# Patient Record
Sex: FEMALE | HISPANIC OR LATINO | Employment: FULL TIME | URBAN - METROPOLITAN AREA
[De-identification: names, ages, dates, MRNs, and addresses within clinical notes are randomized per-mention and may not be internally consistent; named-entity substitution may affect disease eponyms.]

---

## 2019-02-20 ENCOUNTER — HOSPITAL ENCOUNTER (EMERGENCY)
Facility: CLINIC | Age: 19
Discharge: HOME | End: 2019-02-20
Attending: EMERGENCY MEDICINE | Admitting: EMERGENCY MEDICINE

## 2019-02-20 VITALS
HEIGHT: 68 IN | RESPIRATION RATE: 18 BRPM | SYSTOLIC BLOOD PRESSURE: 113 MMHG | TEMPERATURE: 98.4 F | DIASTOLIC BLOOD PRESSURE: 70 MMHG | OXYGEN SATURATION: 99 % | BODY MASS INDEX: 21.98 KG/M2 | WEIGHT: 145 LBS | HEART RATE: 88 BPM

## 2019-02-20 DIAGNOSIS — R55 SYNCOPE, UNSPECIFIED SYNCOPE TYPE: ICD-10-CM

## 2019-02-20 LAB
ALBUMIN UR-MCNC: NEGATIVE MG/DL
ANION GAP SERPL CALCULATED.3IONS-SCNC: 10 MMOL/L (ref 3–14)
APPEARANCE UR: ABNORMAL
BACTERIA #/AREA URNS HPF: ABNORMAL /HPF
BASOPHILS # BLD AUTO: 0 10E9/L (ref 0–0.2)
BASOPHILS NFR BLD AUTO: 0.5 %
BILIRUB UR QL STRIP: NEGATIVE
BUN SERPL-MCNC: 15 MG/DL (ref 7–30)
CALCIUM SERPL-MCNC: 9.3 MG/DL (ref 8.5–10.1)
CHLORIDE SERPL-SCNC: 103 MMOL/L (ref 96–110)
CO2 SERPL-SCNC: 25 MMOL/L (ref 20–32)
COLOR UR AUTO: YELLOW
CREAT SERPL-MCNC: 0.62 MG/DL (ref 0.5–1)
DIFFERENTIAL METHOD BLD: NORMAL
EOSINOPHIL # BLD AUTO: 0 10E9/L (ref 0–0.7)
EOSINOPHIL NFR BLD AUTO: 0.3 %
ERYTHROCYTE [DISTWIDTH] IN BLOOD BY AUTOMATED COUNT: 12.3 % (ref 10–15)
GFR SERPL CREATININE-BSD FRML MDRD: >90 ML/MIN/{1.73_M2}
GLUCOSE SERPL-MCNC: 83 MG/DL (ref 70–99)
GLUCOSE UR STRIP-MCNC: NEGATIVE MG/DL
HCT VFR BLD AUTO: 43.3 % (ref 35–47)
HGB BLD-MCNC: 14.6 G/DL (ref 11.7–15.7)
HGB UR QL STRIP: NEGATIVE
IMM GRANULOCYTES # BLD: 0 10E9/L (ref 0–0.4)
IMM GRANULOCYTES NFR BLD: 0.3 %
KETONES UR STRIP-MCNC: 20 MG/DL
LEUKOCYTE ESTERASE UR QL STRIP: ABNORMAL
LYMPHOCYTES # BLD AUTO: 1.9 10E9/L (ref 0.8–5.3)
LYMPHOCYTES NFR BLD AUTO: 23.7 %
MCH RBC QN AUTO: 28.1 PG (ref 26.5–33)
MCHC RBC AUTO-ENTMCNC: 33.7 G/DL (ref 31.5–36.5)
MCV RBC AUTO: 83 FL (ref 78–100)
MONOCYTES # BLD AUTO: 0.5 10E9/L (ref 0–1.3)
MONOCYTES NFR BLD AUTO: 6.1 %
MUCOUS THREADS #/AREA URNS LPF: PRESENT /LPF
NEUTROPHILS # BLD AUTO: 5.4 10E9/L (ref 1.6–8.3)
NEUTROPHILS NFR BLD AUTO: 69.1 %
NITRATE UR QL: NEGATIVE
NRBC # BLD AUTO: 0 10*3/UL
NRBC BLD AUTO-RTO: 0 /100
PH UR STRIP: 5 PH (ref 5–7)
PLATELET # BLD AUTO: 275 10E9/L (ref 150–450)
POTASSIUM SERPL-SCNC: 3.5 MMOL/L (ref 3.4–5.3)
RBC # BLD AUTO: 5.19 10E12/L (ref 3.8–5.2)
RBC #/AREA URNS AUTO: 4 /HPF (ref 0–2)
SODIUM SERPL-SCNC: 138 MMOL/L (ref 133–144)
SOURCE: ABNORMAL
SP GR UR STRIP: 1.03 (ref 1–1.03)
SQUAMOUS #/AREA URNS AUTO: <1 /HPF (ref 0–1)
UROBILINOGEN UR STRIP-MCNC: 0 MG/DL (ref 0–2)
WBC # BLD AUTO: 7.9 10E9/L (ref 4–11)
WBC #/AREA URNS AUTO: 7 /HPF (ref 0–5)

## 2019-02-20 PROCEDURE — 25000128 H RX IP 250 OP 636: Performed by: EMERGENCY MEDICINE

## 2019-02-20 PROCEDURE — 80048 BASIC METABOLIC PNL TOTAL CA: CPT | Performed by: EMERGENCY MEDICINE

## 2019-02-20 PROCEDURE — 87086 URINE CULTURE/COLONY COUNT: CPT | Performed by: EMERGENCY MEDICINE

## 2019-02-20 PROCEDURE — 93005 ELECTROCARDIOGRAM TRACING: CPT

## 2019-02-20 PROCEDURE — 81001 URINALYSIS AUTO W/SCOPE: CPT | Performed by: EMERGENCY MEDICINE

## 2019-02-20 PROCEDURE — 99284 EMERGENCY DEPT VISIT MOD MDM: CPT | Mod: 25

## 2019-02-20 PROCEDURE — 85025 COMPLETE CBC W/AUTO DIFF WBC: CPT | Performed by: EMERGENCY MEDICINE

## 2019-02-20 PROCEDURE — 96361 HYDRATE IV INFUSION ADD-ON: CPT

## 2019-02-20 PROCEDURE — 96360 HYDRATION IV INFUSION INIT: CPT

## 2019-02-20 RX ORDER — ESTRADIOL 1 MG/1
1 TABLET ORAL DAILY
COMMUNITY
End: 2023-08-01

## 2019-02-20 RX ORDER — DEXTROAMPHETAMINE SACCHARATE, AMPHETAMINE ASPARTATE, DEXTROAMPHETAMINE SULFATE AND AMPHETAMINE SULFATE 3.75; 3.75; 3.75; 3.75 MG/1; MG/1; MG/1; MG/1
15 TABLET ORAL 2 TIMES DAILY
COMMUNITY
End: 2023-12-27

## 2019-02-20 RX ORDER — SODIUM CHLORIDE 9 MG/ML
1000 INJECTION, SOLUTION INTRAVENOUS CONTINUOUS
Status: DISCONTINUED | OUTPATIENT
Start: 2019-02-20 | End: 2019-02-21 | Stop reason: HOSPADM

## 2019-02-20 RX ADMIN — SODIUM CHLORIDE 1000 ML: 9 INJECTION, SOLUTION INTRAVENOUS at 21:58

## 2019-02-20 RX ADMIN — SODIUM CHLORIDE 1000 ML: 9 INJECTION, SOLUTION INTRAVENOUS at 20:01

## 2019-02-20 ASSESSMENT — ENCOUNTER SYMPTOMS
LIGHT-HEADEDNESS: 1
DYSURIA: 1
WEAKNESS: 0
NAUSEA: 1
HEADACHES: 1
FEVER: 0

## 2019-02-20 ASSESSMENT — MIFFLIN-ST. JEOR: SCORE: 1481.22

## 2019-02-20 NOTE — LETTER
February 20, 2019      To Whom It May Concern:      Cindi Armas was seen in our Emergency Department today, 02/20/19.  I expect her condition to improve over the next 1-2 days.  She may return to work/school when improved.    Sincerely,        Jennie Sahni RN

## 2019-02-20 NOTE — ED AVS SNAPSHOT
[facility] Emergency Department  [address]  [facility] [address]  Phone:  [phone]  Fax:  [phone]                                    Cindi Armas   MRN: [id number]    Department:  [facility] Emergency Department   Date of Visit:  2/20/2019           After Visit Summary Signature Page    I have received my discharge instructions, and my questions have been answered. I have discussed any challenges I see with this plan with the nurse or doctor.    ..........................................................................................................................................  Patient/Patient Representative Signature      ..........................................................................................................................................  Patient Representative Print Name and Relationship to Patient    ..................................................               ................................................  Date                                   Time    ..........................................................................................................................................  Reviewed by Signature/Title    ...................................................              ..............................................  Date                                               Time          22EPIC Rev 08/18

## 2019-02-21 LAB — INTERPRETATION ECG - MUSE: NORMAL

## 2019-02-21 NOTE — ED PROVIDER NOTES
"  History     Chief Complaint:  loss of consciousness     HPI   Cindi Armas is a generally healthy 18 year old transgender female who presents after an episode of loss of consciousness. The patient states that she was working today when she felt nausea and approximately 2 hours later, felt dizzy and had an episode of loss of consciousness, prompting her ED visit. Here, the patient states that did not eat or drink anything today. She complains of headache, nausea, and fatigue. The patient notes that she has been having headaches for the past month with associated photophobia. Of note, the patient states that she has been having dysuria ongoing for a month approximately as well as increased urgency. She denies any fever.     Allergies:  NKDA     Medications:    Adderall  Estradiol    Past Medical History:    Transgender    Past Surgical History:    The patient does not have any pertinent past surgical history.     Family History:    No past pertinent family history.     Social History:  Negative for alcohol use.  Negative for tobacco use.     Review of Systems   Constitutional: Negative for fever.   Gastrointestinal: Positive for nausea.   Genitourinary: Positive for dysuria and urgency.   Neurological: Positive for light-headedness and headaches. Negative for weakness.   All other systems reviewed and are negative.      Physical Exam   First Vitals:  BP: 133/81  Pulse: 90  Heart Rate: 90  Temp: 98.4  F (36.9  C)  Resp: 18  Height: 172.7 cm (5' 8\")  Weight: 65.8 kg (145 lb)  SpO2: 95 %    Orthostatics 2016:  Laying:   BP: 122/76  Pulse: 78 bmp    Sitting:  BP: 125/80  Pulse: 80 bmp     Orthostatics 2144:  Laying:  BP: 116/72  Pulse: 75 bpm    Sitting:   BP: 112/63  Pulse: 89 bpm    Standing:  BP: 106/67  Pulse: 103 bpm      Physical Exam  General: Patient is alert and interactive when I enter the room  Head:  The scalp, face, and head appear normal  Eyes:  Conjunctivae are normal  ENT:    The nose is " normal    Pinnae are normal    External acoustic canals are normal  Neck:  Trachea midline  CV:  Pulses are normal, RRR  Resp:  No respiratory distress, CTAB   Abdomen:      Soft, non-tender, non-distended  Musc:  Normal muscular tone    No major joint effusions    No asymmetric leg swelling  Skin:  No rash or lesions noted  Neuro:  Speech is normal and fluent. Face is symmetric.     Moving all extremities well.   Psych: Awake. Alert.  Normal affect.  Appropriate interactions.    Emergency Department Course   ECG:  Indication: loss of consciousness   Time: 1922  Vent. Rate 81 bpm. MD interval 158. QRS duration 82. QT/QTc 374/434. P-R-T axis 61 48 23. Normal sinus rhythm. Normal ECG. Read time: 1924.      Laboratory:  CBC: WBC: 7.9, HGB: 14.6, PLT: 275  BMP: all WNL (Creatinine: 0.62)     UA with micro: ketones: 20, small leukocyte esterase, WBC: 7, RBC: 4, few bacteria, mucous present, o/w negative     Interventions:  2001 0.9% Sodium Chloride Bolus, 1L, IV    2158 0.9% Sodium Chloride Bolus, 1L, IV     Emergency Department Course:  Nursing notes and vitals reviewed.     1947  I performed an exam of the patient as documented above.     IV inserted. Medicine administered as documented above. Blood drawn. This was sent to the lab for further testing, results above. The patient provided a urine sample here in the emergency department. This was sent for laboratory testing, findings above.      2333 I rechecked the patient and discussed the results of her workup thus far.     Findings and plan explained to the Patient. Patient discharged home with instructions regarding supportive care, medications, and reasons to return. The importance of close follow-up was reviewed.     I personally reviewed the laboratory results with the Patient and answered all related questions prior to discharge.        Impression & Plan      Medical Decision Making:  Cindi Armas is a 18 year old female who presents with a history and  clinical exam consistent with syncope.  While dehydration was the most likely etiology given the history of this patient, I considered a broad differential for their syncope today including cardiac arrhythmia, ACS, aortic stenosis, HOCM, PE, orthostatic hypotension, drugs, situational, carotid hypersensitivity, seizure, TIA, stroke, vasovagal.   She has no signs of a concerning etiology for syncope at this point.  In addition, she has no chest pain, no seizure activity or post-ictal period and no murmur. No focal neurologic symptoms, and no complaints of concerning headache.  So was given 2 L of fluid and encouraged oral hydration.  Patient felt much improved and was able to ambulate with no symptoms.  The workup in the ED is negative and the physical exam is re-assuring.  Supportive outpatient management is therefore indicated.      Diagnosis:    ICD-10-CM   1. Syncope, unspecified syncope type R55       Disposition:  discharged to home    IKelly, am serving as a scribe on 2/20/2019 at 10:28 PM to personally document services performed by Ashli Perez MD based on my observations and the provider's statements to me.      Kelly Zapata  2/20/2019   [facility] EMERGENCY DEPARTMENT       Ashli Perez MD  02/23/19 0329

## 2019-02-21 NOTE — ED NOTES
Per MD Perez request patient was ambulated without assistance and tolerated well. MD Perez notified

## 2019-02-21 NOTE — ED TRIAGE NOTES
Dizzy and then remember waking sitting on the floor. Per staff at patient's work, they stated that patient bump her head. Patient c/o headache, body aches and right lateral neck pain. denies any cervical midline tenderness/stiffness. ABCs intact.

## 2019-02-22 LAB
BACTERIA SPEC CULT: NO GROWTH
Lab: NORMAL
SPECIMEN SOURCE: NORMAL

## 2019-02-22 NOTE — RESULT ENCOUNTER NOTE
Final urine culture report is NEGATIVE per [facility] ED Lab Result protocol.    If NEGATIVE result, no change in treatment, per [facility] ED Lab Result protocol.

## 2020-11-26 ENCOUNTER — HOSPITAL ENCOUNTER (EMERGENCY)
Facility: CLINIC | Age: 20
Discharge: HOME | End: 2020-11-26
Attending: EMERGENCY MEDICINE | Admitting: EMERGENCY MEDICINE

## 2020-11-26 VITALS
WEIGHT: 180 LBS | TEMPERATURE: 97.7 F | RESPIRATION RATE: 16 BRPM | DIASTOLIC BLOOD PRESSURE: 83 MMHG | OXYGEN SATURATION: 97 % | BODY MASS INDEX: 27.37 KG/M2 | HEART RATE: 85 BPM | SYSTOLIC BLOOD PRESSURE: 124 MMHG

## 2020-11-26 DIAGNOSIS — T25.211A PARTIAL THICKNESS BURN OF RIGHT ANKLE, INITIAL ENCOUNTER: ICD-10-CM

## 2020-11-26 DIAGNOSIS — S90.521A BLISTER OF RIGHT ANKLE, INITIAL ENCOUNTER: ICD-10-CM

## 2020-11-26 PROCEDURE — 99283 EMERGENCY DEPT VISIT LOW MDM: CPT | Mod: 25

## 2020-11-26 PROCEDURE — 16020 DRESS/DEBRID P-THICK BURN S: CPT

## 2020-11-26 PROCEDURE — 250N000013 HC RX MED GY IP 250 OP 250 PS 637: Performed by: EMERGENCY MEDICINE

## 2020-11-26 RX ORDER — IBUPROFEN 600 MG/1
600 TABLET, FILM COATED ORAL EVERY 6 HOURS PRN
Qty: 20 TABLET | Refills: 0 | Status: SHIPPED | OUTPATIENT
Start: 2020-11-26 | End: 2023-09-11

## 2020-11-26 RX ORDER — IBUPROFEN 600 MG/1
600 TABLET, FILM COATED ORAL ONCE
Status: COMPLETED | OUTPATIENT
Start: 2020-11-26 | End: 2020-11-26

## 2020-11-26 RX ADMIN — IBUPROFEN 600 MG: 600 TABLET, FILM COATED ORAL at 01:25

## 2020-11-26 NOTE — ED AVS SNAPSHOT
[facility] Emergency Dept  [address]  [facility] [address]  Phone: [phone]  Fax: [phone]                                    Cindi Armas   MRN: [id number]    Department: [facility] Emergency Dept   Date of Visit: 11/26/2020           After Visit Summary Signature Page    I have received my discharge instructions, and my questions have been answered. I have discussed any challenges I see with this plan with the nurse or doctor.    ..........................................................................................................................................  Patient/Patient Representative Signature      ..........................................................................................................................................  Patient Representative Print Name and Relationship to Patient    ..................................................               ................................................  Date                                   Time    ..........................................................................................................................................  Reviewed by Signature/Title    ...................................................              ..............................................  Date                                               Time          22EPIC Rev 08/18
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
## 2021-10-15 DIAGNOSIS — Z52.001 STEM CELL DONOR: Primary | ICD-10-CM

## 2021-11-22 DIAGNOSIS — Z52.001 STEM CELL DONOR: ICD-10-CM

## 2021-11-30 LAB
A*: NORMAL
A*LOCUS SEROLOGIC EQUIVALENT: 1
A*LOCUS: NORMAL
A*SEROLOGIC EQUIVALENT: 2
ABTEST METHOD: NORMAL
B*: NORMAL
B*LOCUS SEROLOGIC EQUIVALENT: 61
B*LOCUS: NORMAL
B*SEROLOGIC EQUIVALENT: 44
BW-1: NORMAL
BW-2: NORMAL
C*: NORMAL
C*LOCUS SEROLOGIC EQUIVALENT: 10
C*LOCUS: NORMAL
C*SEROLOGIC EQUIVALENT: 16
DPA1*: NORMAL
DPB1*: NORMAL
DPB1*LOCUS NMDP: NORMAL
DPB1*LOCUS: NORMAL
DPB1*NMDP: NORMAL
DQA1*: NORMAL
DQA1*LOCUS: NORMAL
DQB1*: NORMAL
DQB1*LOCUS SEROLOGIC EQUIVALENT: 8
DQB1*LOCUS: NORMAL
DQB1*SEROLOGIC EQUIVALENT: 6
DRB1*: NORMAL
DRB1*LOCUS SEROLOGIC EQUIVALENT: 4
DRB1*LOCUS: NORMAL
DRB1*SEROLOGIC EQUIVALENT: 15
DRB4*LOCUS SEROLOGIC EQUIVALENT: 53
DRB4*LOCUS: NORMAL
DRB5*: NORMAL
DRB5*SEROLOGIC EQUIVALENT: 51
DRSSO TEST METHOD: NORMAL

## 2023-07-30 ENCOUNTER — HEALTH MAINTENANCE LETTER (OUTPATIENT)
Age: 23
End: 2023-07-30

## 2023-08-01 ENCOUNTER — TELEPHONE (OUTPATIENT)
Dept: FAMILY MEDICINE | Facility: CLINIC | Age: 23
End: 2023-08-01

## 2023-08-01 ENCOUNTER — OFFICE VISIT (OUTPATIENT)
Dept: FAMILY MEDICINE | Facility: CLINIC | Age: 23
End: 2023-08-01
Payer: COMMERCIAL

## 2023-08-01 VITALS
DIASTOLIC BLOOD PRESSURE: 69 MMHG | HEIGHT: 68 IN | HEART RATE: 80 BPM | BODY MASS INDEX: 25.22 KG/M2 | OXYGEN SATURATION: 98 % | RESPIRATION RATE: 16 BRPM | SYSTOLIC BLOOD PRESSURE: 110 MMHG | TEMPERATURE: 98.4 F | WEIGHT: 166.4 LBS

## 2023-08-01 DIAGNOSIS — F43.10 PTSD (POST-TRAUMATIC STRESS DISORDER): ICD-10-CM

## 2023-08-01 DIAGNOSIS — Z78.9 MALE-TO-FEMALE TRANSGENDER PERSON: Primary | ICD-10-CM

## 2023-08-01 DIAGNOSIS — G47.00 INSOMNIA, UNSPECIFIED TYPE: ICD-10-CM

## 2023-08-01 DIAGNOSIS — F41.1 GENERALIZED ANXIETY DISORDER: ICD-10-CM

## 2023-08-01 DIAGNOSIS — F90.9 ATTENTION DEFICIT HYPERACTIVITY DISORDER (ADHD), UNSPECIFIED ADHD TYPE: ICD-10-CM

## 2023-08-01 DIAGNOSIS — F33.41 RECURRENT MAJOR DEPRESSIVE DISORDER, IN PARTIAL REMISSION (H): ICD-10-CM

## 2023-08-01 DIAGNOSIS — Z11.3 SCREENING FOR STDS (SEXUALLY TRANSMITTED DISEASES): ICD-10-CM

## 2023-08-01 DIAGNOSIS — Z11.4 SCREENING FOR HIV (HUMAN IMMUNODEFICIENCY VIRUS): ICD-10-CM

## 2023-08-01 DIAGNOSIS — Z11.59 NEED FOR HEPATITIS C SCREENING TEST: ICD-10-CM

## 2023-08-01 DIAGNOSIS — Z13.220 SCREENING CHOLESTEROL LEVEL: ICD-10-CM

## 2023-08-01 LAB
ALBUMIN SERPL BCG-MCNC: 4.4 G/DL (ref 3.5–5.2)
ALP SERPL-CCNC: 78 U/L (ref 35–104)
ALT SERPL W P-5'-P-CCNC: 11 U/L (ref 0–50)
AMPHETAMINES UR QL: NOT DETECTED
ANION GAP SERPL CALCULATED.3IONS-SCNC: 10 MMOL/L (ref 7–15)
AST SERPL W P-5'-P-CCNC: 19 U/L (ref 0–45)
BARBITURATES UR QL SCN: NOT DETECTED
BENZODIAZ UR QL SCN: NOT DETECTED
BILIRUB SERPL-MCNC: 0.5 MG/DL
BUN SERPL-MCNC: 12.6 MG/DL (ref 6–20)
BUPRENORPHINE UR QL: NOT DETECTED
C TRACH DNA SPEC QL PROBE+SIG AMP: NEGATIVE
CALCIUM SERPL-MCNC: 9 MG/DL (ref 8.6–10)
CANNABINOIDS UR QL: NOT DETECTED
CHLORIDE SERPL-SCNC: 103 MMOL/L (ref 98–107)
CHOLEST SERPL-MCNC: 169 MG/DL
COCAINE UR QL SCN: NOT DETECTED
CREAT SERPL-MCNC: 0.58 MG/DL (ref 0.51–0.95)
D-METHAMPHET UR QL: NOT DETECTED
DEPRECATED HCO3 PLAS-SCNC: 26 MMOL/L (ref 22–29)
ERYTHROCYTE [DISTWIDTH] IN BLOOD BY AUTOMATED COUNT: 13.2 % (ref 10–15)
ESTRADIOL SERPL-MCNC: <5 PG/ML
GFR SERPL CREATININE-BSD FRML MDRD: >90 ML/MIN/1.73M2
GLUCOSE SERPL-MCNC: 93 MG/DL (ref 70–99)
HCT VFR BLD AUTO: 39.7 % (ref 35–47)
HDLC SERPL-MCNC: 62 MG/DL
HGB BLD-MCNC: 13.5 G/DL (ref 11.7–15.7)
HIV 1+2 AB+HIV1 P24 AG SERPL QL IA: NONREACTIVE
LDLC SERPL CALC-MCNC: 89 MG/DL
MCH RBC QN AUTO: 28.7 PG (ref 26.5–33)
MCHC RBC AUTO-ENTMCNC: 34 G/DL (ref 31.5–36.5)
MCV RBC AUTO: 85 FL (ref 78–100)
METHADONE UR QL SCN: NOT DETECTED
N GONORRHOEA DNA SPEC QL NAA+PROBE: NEGATIVE
NONHDLC SERPL-MCNC: 107 MG/DL
OPIATES UR QL SCN: DETECTED
OXYCODONE UR QL SCN: NOT DETECTED
PCP UR QL SCN: NOT DETECTED
PLATELET # BLD AUTO: 225 10E3/UL (ref 150–450)
POTASSIUM SERPL-SCNC: 4.1 MMOL/L (ref 3.4–5.3)
PROPOXYPH UR QL: NOT DETECTED
PROT SERPL-MCNC: 7.1 G/DL (ref 6.4–8.3)
RBC # BLD AUTO: 4.7 10E6/UL (ref 3.8–5.2)
SODIUM SERPL-SCNC: 139 MMOL/L (ref 136–145)
TRICYCLICS UR QL SCN: NOT DETECTED
TRIGL SERPL-MCNC: 89 MG/DL
WBC # BLD AUTO: 5.8 10E3/UL (ref 4–11)

## 2023-08-01 PROCEDURE — 80306 DRUG TEST PRSMV INSTRMNT: CPT | Performed by: FAMILY MEDICINE

## 2023-08-01 PROCEDURE — 82670 ASSAY OF TOTAL ESTRADIOL: CPT | Performed by: FAMILY MEDICINE

## 2023-08-01 PROCEDURE — 90651 9VHPV VACCINE 2/3 DOSE IM: CPT | Performed by: FAMILY MEDICINE

## 2023-08-01 PROCEDURE — 80053 COMPREHEN METABOLIC PANEL: CPT | Performed by: FAMILY MEDICINE

## 2023-08-01 PROCEDURE — 87591 N.GONORRHOEAE DNA AMP PROB: CPT | Performed by: FAMILY MEDICINE

## 2023-08-01 PROCEDURE — 86780 TREPONEMA PALLIDUM: CPT | Performed by: FAMILY MEDICINE

## 2023-08-01 PROCEDURE — 85027 COMPLETE CBC AUTOMATED: CPT | Performed by: FAMILY MEDICINE

## 2023-08-01 PROCEDURE — 86803 HEPATITIS C AB TEST: CPT | Performed by: FAMILY MEDICINE

## 2023-08-01 PROCEDURE — 80061 LIPID PANEL: CPT | Performed by: FAMILY MEDICINE

## 2023-08-01 PROCEDURE — 36415 COLL VENOUS BLD VENIPUNCTURE: CPT | Performed by: FAMILY MEDICINE

## 2023-08-01 PROCEDURE — 84270 ASSAY OF SEX HORMONE GLOBUL: CPT | Performed by: FAMILY MEDICINE

## 2023-08-01 PROCEDURE — 87389 HIV-1 AG W/HIV-1&-2 AB AG IA: CPT | Performed by: FAMILY MEDICINE

## 2023-08-01 PROCEDURE — 99204 OFFICE O/P NEW MOD 45 MIN: CPT | Mod: 25 | Performed by: FAMILY MEDICINE

## 2023-08-01 PROCEDURE — 90471 IMMUNIZATION ADMIN: CPT | Performed by: FAMILY MEDICINE

## 2023-08-01 PROCEDURE — 0121A COVID-19 BIVALENT 12+ (PFIZER): CPT | Performed by: FAMILY MEDICINE

## 2023-08-01 PROCEDURE — 91312 COVID-19 BIVALENT 12+ (PFIZER): CPT | Performed by: FAMILY MEDICINE

## 2023-08-01 PROCEDURE — 84403 ASSAY OF TOTAL TESTOSTERONE: CPT | Performed by: FAMILY MEDICINE

## 2023-08-01 PROCEDURE — 87491 CHLMYD TRACH DNA AMP PROBE: CPT | Performed by: FAMILY MEDICINE

## 2023-08-01 RX ORDER — DEXTROAMPHETAMINE SACCHARATE, AMPHETAMINE ASPARTATE MONOHYDRATE, DEXTROAMPHETAMINE SULFATE AND AMPHETAMINE SULFATE 3.75; 3.75; 3.75; 3.75 MG/1; MG/1; MG/1; MG/1
15 CAPSULE, EXTENDED RELEASE ORAL DAILY
Qty: 30 CAPSULE | Refills: 0 | Status: SHIPPED | OUTPATIENT
Start: 2023-09-01 | End: 2023-10-01

## 2023-08-01 RX ORDER — DEXTROAMPHETAMINE SACCHARATE, AMPHETAMINE ASPARTATE MONOHYDRATE, DEXTROAMPHETAMINE SULFATE AND AMPHETAMINE SULFATE 3.75; 3.75; 3.75; 3.75 MG/1; MG/1; MG/1; MG/1
15 CAPSULE, EXTENDED RELEASE ORAL DAILY
Qty: 30 CAPSULE | Refills: 0 | Status: SHIPPED | OUTPATIENT
Start: 2023-08-01 | End: 2023-08-31

## 2023-08-01 RX ORDER — PROPRANOLOL HYDROCHLORIDE 20 MG/1
20 TABLET ORAL 2 TIMES DAILY PRN
Qty: 180 TABLET | Refills: 1 | Status: SHIPPED | OUTPATIENT
Start: 2023-08-01 | End: 2023-09-11

## 2023-08-01 RX ORDER — PRAZOSIN HYDROCHLORIDE 1 MG/1
1 CAPSULE ORAL AT BEDTIME
Qty: 90 CAPSULE | Refills: 1 | Status: SHIPPED | OUTPATIENT
Start: 2023-08-01 | End: 2023-09-11

## 2023-08-01 RX ORDER — GABAPENTIN 300 MG/1
300 CAPSULE ORAL
COMMUNITY
Start: 2022-09-21 | End: 2023-08-01

## 2023-08-01 RX ORDER — SERTRALINE HYDROCHLORIDE 100 MG/1
200 TABLET, FILM COATED ORAL DAILY
Qty: 180 TABLET | Refills: 1 | Status: SHIPPED | OUTPATIENT
Start: 2023-08-01 | End: 2024-03-01

## 2023-08-01 RX ORDER — TRAZODONE HYDROCHLORIDE 100 MG/1
100 TABLET ORAL AT BEDTIME
Qty: 90 TABLET | Refills: 1 | Status: SHIPPED | OUTPATIENT
Start: 2023-08-01 | End: 2024-03-01

## 2023-08-01 RX ORDER — GABAPENTIN 300 MG/1
300 CAPSULE ORAL 2 TIMES DAILY
Qty: 180 CAPSULE | Refills: 1 | Status: SHIPPED | OUTPATIENT
Start: 2023-08-01 | End: 2023-09-11

## 2023-08-01 RX ORDER — DEXTROAMPHETAMINE/AMPHETAMINE 15 MG
15 CAPSULE, EXT RELEASE 24 HR ORAL DAILY
COMMUNITY
Start: 2023-05-23 | End: 2023-08-07

## 2023-08-01 RX ORDER — ESTRADIOL 1 MG/1
1 TABLET ORAL DAILY
Qty: 90 TABLET | Refills: 1 | Status: SHIPPED | OUTPATIENT
Start: 2023-08-01 | End: 2024-04-23

## 2023-08-01 RX ORDER — PROPRANOLOL HYDROCHLORIDE 20 MG/1
10 TABLET ORAL
COMMUNITY
Start: 2022-09-21 | End: 2023-08-01

## 2023-08-01 RX ORDER — DEXTROAMPHETAMINE SACCHARATE, AMPHETAMINE ASPARTATE MONOHYDRATE, DEXTROAMPHETAMINE SULFATE AND AMPHETAMINE SULFATE 3.75; 3.75; 3.75; 3.75 MG/1; MG/1; MG/1; MG/1
15 CAPSULE, EXTENDED RELEASE ORAL DAILY
Qty: 30 CAPSULE | Refills: 0 | Status: SHIPPED | OUTPATIENT
Start: 2023-10-02 | End: 2023-11-01

## 2023-08-01 RX ORDER — SERTRALINE HYDROCHLORIDE 100 MG/1
100 TABLET, FILM COATED ORAL DAILY
COMMUNITY
Start: 2022-09-21 | End: 2023-08-01

## 2023-08-01 RX ORDER — TRAZODONE HYDROCHLORIDE 100 MG/1
100 TABLET ORAL
COMMUNITY
Start: 2022-09-21 | End: 2023-08-01

## 2023-08-01 ASSESSMENT — ANXIETY QUESTIONNAIRES
IF YOU CHECKED OFF ANY PROBLEMS ON THIS QUESTIONNAIRE, HOW DIFFICULT HAVE THESE PROBLEMS MADE IT FOR YOU TO DO YOUR WORK, TAKE CARE OF THINGS AT HOME, OR GET ALONG WITH OTHER PEOPLE: EXTREMELY DIFFICULT
6. BECOMING EASILY ANNOYED OR IRRITABLE: NEARLY EVERY DAY
2. NOT BEING ABLE TO STOP OR CONTROL WORRYING: NEARLY EVERY DAY
7. FEELING AFRAID AS IF SOMETHING AWFUL MIGHT HAPPEN: NEARLY EVERY DAY
1. FEELING NERVOUS, ANXIOUS, OR ON EDGE: MORE THAN HALF THE DAYS
4. TROUBLE RELAXING: MORE THAN HALF THE DAYS
GAD7 TOTAL SCORE: 18
8. IF YOU CHECKED OFF ANY PROBLEMS, HOW DIFFICULT HAVE THESE MADE IT FOR YOU TO DO YOUR WORK, TAKE CARE OF THINGS AT HOME, OR GET ALONG WITH OTHER PEOPLE?: EXTREMELY DIFFICULT
7. FEELING AFRAID AS IF SOMETHING AWFUL MIGHT HAPPEN: NEARLY EVERY DAY
5. BEING SO RESTLESS THAT IT IS HARD TO SIT STILL: NEARLY EVERY DAY
3. WORRYING TOO MUCH ABOUT DIFFERENT THINGS: MORE THAN HALF THE DAYS
GAD7 TOTAL SCORE: 18
GAD7 TOTAL SCORE: 18

## 2023-08-01 ASSESSMENT — PAIN SCALES - GENERAL: PAINLEVEL: NO PAIN (0)

## 2023-08-01 NOTE — PROGRESS NOTES
Assessment & Plan     Male-to-female transgender person  I refilled the estradiol and she was given a [facility] to [facility].  I also recommended checking labs as listed below.  These labs were stable last year when checked by her previous provider.  - Comprehensive metabolic panel (BMP + Alb, Alk Phos, ALT, AST, Total. Bili, TP); Future  - Estradiol; Future  - Testosterone Free and Total; Future  - CBC with platelets; Future  - [facility]  Referral; Future  - estradiol (ESTRACE) 1 MG tablet; Take 1 tablet (1 mg) by mouth daily    Attention deficit hyperactivity disorder (ADHD), unspecified ADHD type  There is no suspicious activity on the  website.  I put in a new psychiatry referral and we are going to be checking a urine drug screen.  I submitted refills for the Adderall which have helped her control ADHD symptoms without unwanted side effects well in the past.  She may schedule follow-up with me in 3 months if she has not already established with psychiatry by then.  - Drug Abuse Screen Panel 13, Urine (Pain Care Map) - lab collect; Future  - Adult Mental Health  Referral; Future    Generalized anxiety disorder  Refills of the sertraline, prazosin gabapentin and propranolol were given.  I also put in a referral for her to establish care with a new psychiatrist.  - Adult Mental Health  Referral; Future  - gabapentin (NEURONTIN) 300 MG capsule; Take 1 capsule (300 mg) by mouth 2 times daily  - propranolol (INDERAL) 20 MG tablet; Take 1 tablet (20 mg) by mouth 2 times daily as needed (Anxiety)  - sertraline (ZOLOFT) 100 MG tablet; Take 2 tablets (200 mg) by mouth daily    Recurrent major depressive disorder, in partial remission (H)  Symptoms have been worse since running out of her medications.  Refills were given as well as a new referral to psychiatry.  - Adult Mental Health  Referral; Future    PTSD (post-traumatic  stress disorder)  - Adult Mental Health  Referral; Future    Screening for STDs (sexually transmitted diseases)  - NEISSERIA GONORRHOEA PCR; Future  - CHLAMYDIA TRACHOMATIS PCR; Future  - Treponema Abs w Reflex to RPR and Titer; Future    Screening for HIV (human immunodeficiency virus)  - HIV Antigen Antibody Combo; Future    Need for hepatitis C screening test  - Hepatitis C Screen Reflex to HCV RNA Quant and Genotype; Future    Screening cholesterol level  - Lipid Profile (Chol, Trig, HDL, LDL calc); Future    Insomnia, unspecified type  - traZODone (DESYREL) 100 MG tablet; Take 1 tablet (100 mg) by mouth At Bedtime                     Kyle Nunez DO  [facility]    Lavell Puente is a 22 year old, presenting for the following health issues:  Establish Care        8/1/2023     9:49 AM   Additional Questions   Roomed by Eldon DIANA       History of Present Illness       Mental Health Follow-up:  Patient presents to follow-up on Depression & Anxiety.Patient's depression since last visit has been:  Good  The patient is not having other symptoms associated with depression.  Patient's anxiety since last visit has been:  Worse  The patient is having other symptoms associated with anxiety.  Any significant life events: relationship concerns, job concerns, financial concerns and health concerns  Patient is feeling anxious or having panic attacks.  Patient has no concerns about alcohol or drug use.    She eats 2-3 servings of fruits and vegetables daily.She consumes 1 sweetened beverage(s) daily.She exercises with enough effort to increase her heart rate 30 to 60 minutes per day.  She exercises with enough effort to increase her heart rate 4 days per week. She is missing 7 dose(s) of medications per week.     Need medication refills, lost connection with previous provider due to scheduling issues.    She has a history significant for gender dysphoria (male to female transgender),  "ADHD, major recurrent depression, generalized anxiety disorder, panic attacks and PTSD.  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  She reports being out of all of her medications for little over a month and mental health symptoms have been worse.  She is also noticed worsening ADHD symptoms.  Focus, organization and distractibility have been problems for her.  She is now currently working as a [employer]  which is otherwise going well, but she is worried about making mistakes at work being off her medications.  She would also like to establish care at a sexual health clinic.  She has been on estradiol 1 mg daily, but ran out of this about a month ago as well.            Review of Systems   Constitutional, HEENT, cardiovascular, pulmonary, GI, , musculoskeletal, neuro, skin, endocrine and psych systems are negative, except as otherwise noted.      Objective    /69   Pulse 80   Temp 98.4  F (36.9  C) (Temporal)   Resp 16   Ht 1.727 m (5' 8\")   Wt 75.5 kg (166 lb 6.4 oz)   SpO2 98%   BMI 25.30 kg/m    Body mass index is 25.3 kg/m .  Physical Exam   GENERAL: healthy, alert and no distress  EYES: Eyes grossly normal to inspection, PERRL and conjunctivae and sclerae normal  HENT:  nose and mouth without ulcers or lesions  NECK: no adenopathy, no asymmetry, masses, or scars and thyroid normal to palpation  RESP: lungs clear to auscultation - no rales, rhonchi or wheezes  CV: regular rate and rhythm, normal S1 S2, no S3 or S4, no murmur, click or rub, no peripheral edema and peripheral pulses strong  ABDOMEN: soft, nontender, no hepatosplenomegaly, no masses and bowel sounds normal  MS: no gross musculoskeletal defects noted, no edema  SKIN: no suspicious lesions or rashes  NEURO: Normal strength and tone, mentation intact and " speech normal  PSYCH: mentation appears normal, affect is mildly anxious.  Does not appear significantly depressed.

## 2023-08-02 LAB
HCV AB SERPL QL IA: NONREACTIVE
SHBG SERPL-SCNC: 24 NMOL/L (ref 30–135)
T PALLIDUM AB SER QL: NONREACTIVE

## 2023-08-03 LAB
TESTOST FREE SERPL-MCNC: 0.15 NG/DL
TESTOST SERPL-MCNC: 7 NG/DL (ref 8–60)

## 2023-08-07 ENCOUNTER — OFFICE VISIT (OUTPATIENT)
Dept: FAMILY MEDICINE | Facility: CLINIC | Age: 23
End: 2023-08-07
Payer: COMMERCIAL

## 2023-08-07 VITALS
TEMPERATURE: 98.7 F | HEART RATE: 76 BPM | SYSTOLIC BLOOD PRESSURE: 116 MMHG | OXYGEN SATURATION: 96 % | RESPIRATION RATE: 16 BRPM | BODY MASS INDEX: 25.01 KG/M2 | HEIGHT: 68 IN | WEIGHT: 165 LBS | DIASTOLIC BLOOD PRESSURE: 67 MMHG

## 2023-08-07 DIAGNOSIS — Z71.84 TRAVEL ADVICE ENCOUNTER: Primary | ICD-10-CM

## 2023-08-07 PROCEDURE — 90717 YELLOW FEVER VACCINE SUBQ: CPT | Mod: GA | Performed by: NURSE PRACTITIONER

## 2023-08-07 PROCEDURE — 99402 PREV MED CNSL INDIV APPRX 30: CPT | Mod: 25 | Performed by: NURSE PRACTITIONER

## 2023-08-07 PROCEDURE — 90472 IMMUNIZATION ADMIN EACH ADD: CPT | Mod: GA | Performed by: NURSE PRACTITIONER

## 2023-08-07 PROCEDURE — 90691 TYPHOID VACCINE IM: CPT | Mod: GA | Performed by: NURSE PRACTITIONER

## 2023-08-07 PROCEDURE — 90471 IMMUNIZATION ADMIN: CPT | Mod: GA | Performed by: NURSE PRACTITIONER

## 2023-08-07 PROCEDURE — 90636 HEP A/HEP B VACC ADULT IM: CPT | Mod: GA | Performed by: NURSE PRACTITIONER

## 2023-08-07 RX ORDER — AZITHROMYCIN 500 MG/1
500 TABLET, FILM COATED ORAL DAILY
Qty: 3 TABLET | Refills: 0 | Status: SHIPPED | OUTPATIENT
Start: 2023-08-07 | End: 2023-08-10

## 2023-08-07 ASSESSMENT — PAIN SCALES - GENERAL: PAINLEVEL: NO PAIN (0)

## 2023-08-07 NOTE — PATIENT INSTRUCTIONS
Thank you for visiting the [facility] : [phone]  Today August 7, 2023 you received the    Twinrix (Hepatitis A & B combo) Vaccine - Please return on 9/6/23 for your 2nd dose and 2/3/24 or later for your 3rd and final dose.    Yellow Fever (YF)    Typhoid - injectable. This vaccine is valid for two years.     Follow up vaccine appointments can be made as a NURSE ONLY visit at the Travel Clinic, (BE PREPARED TO WAIT, ) or at designated [facility].    If you are receiving the Rabies vaccines series, it is important that you follow the exact schedule ordered.     Pre-travel     We recommend that you purchase Medical Evacuation Insurance prior to your departure.  Https://wwwnc.cdc.gov/travel/page/insurance    [place] your travel plans with the [employer] Department of State through STEP ( Smart Traveler Enrollment Program ) https://step.MedClimate.gov.  STEP is a free service to allow [place] citizens and nationals traveling and living abroad to enroll their trip with the nearest U[place] Embassy or Consulate.    Animal Exposure: Avoid all mammals even if they look healthy.  If there is a bite, scratch or even a lick, wash area immediately with soap and water for 15 minutes and seek medical care within 24 hours for evaluation of Rabies post exposure treatment.  Contact your Medical Evacuation Insurance.    Repiratory illness prevention strategies ( Covid and Influenza ) CDC recommendations:  Consider wearing a mask in crowded or poorly ventilated indoor areas, including on public transportation and in transportation hubs.  Hand washing: frequent, thorough handwashing with soap and water for 20 seconds. Use an alcohol-based hand  with at least 60% alcohol if soap and water are not readily available. Avoid touching face, nose, eyes, mouth unless you have done appropriate hand washing as above.  VACCINES: Staying up to date on COVID-19 vaccines significantly lowers the risk of  getting very sick, being hospitalized, or dying from COVID-19. CDC recommends that everyone stay up to date on their COVID-19 vaccines, especially people with weakened immune systems.    Travel Covid 19 Testing:  updated 12/06/2021  International travelers: Pre-travel:  See country specific Embassy websites or airline websites.    Post travel: CDC recommends getting tested 3-5 days after your trip     Post-travel illness:  Contact your provider or [facility] if you develop a fever, rash, cough, diarrhea or other symptoms for up to 1 year after travel.  Inform your healthcare provider when and where you traveled to.    Please call the [facility] with any questions [phone]  Or send your provider a 'My Chart' note.

## 2023-08-07 NOTE — PROGRESS NOTES
"Nurse Note ( Pre-Travel Consult)    Itinerary:  [place], [place], [place]     Departure Date: n/a    Return Date: n/a    Length of Trip n/a    Reason for Travel: Business         Urban or rural: urban    Accommodations: Hotel        IMMUNIZATION HISTORY  Have you received any immunizations within the past 4 weeks?  Yes  Have you ever fainted from having your blood drawn or from an injection?  No  Have you ever had a fever reaction to vaccination?  Yes  Have you ever had any bad reaction or side effect from any vaccination?  No  Have you ever had hepatitis A or B vaccine?  No  Do you live (or work closely) with anyone who has AIDS, an AIDS-like condition, any other immune disorder or who is on chemotherapy for cancer?  No  Do you have a family history of immunodeficiency?  No  Have you received any injection of immune globulin or any blood products during the past 12 months?  No    Patient roomed by Rajwinder Puente Chica Armas is a 22 year old female seen today alone for counsultation for international travel.   Maalonso is a  and she is required to have the Yellow Fever vaccine.     Cindi will also be spending time in [facility] in November and other future destinations   Patient's activities will include  and sightseeing    Patient[place] country of birth is [place]    Special medical concerns: Anxiety/depression  Pre-travel questionnaire was completed by patient and reviewed by provider.     Vitals: /67 (BP Location: Left arm, Patient Position: Sitting, Cuff Size: Adult Regular)   Pulse 76   Temp 98.7  F (37.1  C) (Oral)   Resp 16   Ht 1.734 m (5' 8.27\")   Wt 74.8 kg (165 lb)   SpO2 96%   BMI 24.89 kg/m    BMI= Body mass index is 24.89 kg/m .    EXAM:  General:  Well-nourished, well-developed in no acute distress.  Appears to be stated age, interacts appropriately and expresses understanding of information given to patient.    Current Outpatient Medications "   Medication Sig Dispense Refill    amphetamine-dextroamphetamine (ADDERALL XR) 15 MG 24 hr capsule Take 1 capsule (15 mg) by mouth daily for 30 days 30 capsule 0    [START ON 9/1/2023] amphetamine-dextroamphetamine (ADDERALL XR) 15 MG 24 hr capsule Take 1 capsule (15 mg) by mouth daily for 30 days 30 capsule 0    [START ON 10/2/2023] amphetamine-dextroamphetamine (ADDERALL XR) 15 MG 24 hr capsule Take 1 capsule (15 mg) by mouth daily for 30 days 30 capsule 0    amphetamine-dextroamphetamine (ADDERALL) 15 MG tablet Take 15 mg by mouth 2 times daily      azithromycin (ZITHROMAX) 500 MG tablet Take 1 tablet (500 mg) by mouth daily for 3 doses Take 1 tablet a day for up to 3 days for severe diarrhea 3 tablet 0    estradiol (ESTRACE) 1 MG tablet Take 1 tablet (1 mg) by mouth daily 90 tablet 1    gabapentin (NEURONTIN) 300 MG capsule Take 1 capsule (300 mg) by mouth 2 times daily 180 capsule 1    prazosin (MINIPRESS) 1 MG capsule Take 1 capsule (1 mg) by mouth At Bedtime 90 capsule 1    propranolol (INDERAL) 20 MG tablet Take 1 tablet (20 mg) by mouth 2 times daily as needed (Anxiety) 180 tablet 1    sertraline (ZOLOFT) 100 MG tablet Take 2 tablets (200 mg) by mouth daily 180 tablet 1    traZODone (DESYREL) 100 MG tablet Take 1 tablet (100 mg) by mouth At Bedtime 90 tablet 1    ibuprofen (ADVIL/MOTRIN) 600 MG tablet Take 1 tablet (600 mg) by mouth every 6 hours as needed for moderate pain (Patient not taking: Reported on 8/1/2023) 20 tablet 0     There is no problem list on file for this patient.    No Known Allergies      Immunizations discussed include:   Covid 19: Up to date  Hepatitis A:  Twin Alexis series started today  Hepatitis B: Twin Alexis series started today  Influenza: vaccine is not available  Typhoid: Ordered/given today, risks, benefits and side effects reviewed  Rabies: Declined  reviewed managment of a animal bite or scratch (washing wound, seek medical care within 24 hours for post exposure prophylaxis  )  Yellow Fever: Yellow Fever ordered/given today - side effects, precautions, allergies, risks discussed. Patient expressed understanding.  Marshallese Encephalitis: Not indicated  Meningococcus: Not indicated  Tetanus/Diphtheria: Up to date  Measles/Mumps/Rubella: Up to date per report  Cholera: Not needed  Polio: Up to date  Pneumococcal: Under age of 65  Varicella: Up to date  Shingrix: Not indicated  HPV:  Up to date     TB: low risk     Altitude Exposure on this trip: no  Past tolerance to Altitude: na    ASSESSMENT/PLAN:  Cindi was seen today for travel clinic.    Diagnoses and all orders for this visit:    Travel advice encounter  -     YELLOW FEVER, LIVE SQ  -     azithromycin (ZITHROMAX) 500 MG tablet; Take 1 tablet (500 mg) by mouth daily for 3 doses Take 1 tablet a day for up to 3 days for severe diarrhea    Other orders  -     HEP A & B (TWINRIX); Standing  -     HEP A & B (TWINRIX)  -     TYPHOID VACCINE, IM      I have reviewed general recommendations for safe travel   including: food/water precautions, insect precautions, safer sex   practices given high prevalence of Zika, HIV and other STDs,   roadway safety. Educational materials and [employer] report provided.    Malaraia prophylaxis recommended: not a significant risk  Symptomatic treatment for traveler's diarrhea: azithromycin        Evacuation insurance advised and resources were provided to patient.    Total visit time 30 minutes  with over 50% of time spent counseling patient and shared decision making as detailed above.    Katty Hernández CNP  Certificate in Travel Health

## 2023-08-08 ENCOUNTER — MYC MEDICAL ADVICE (OUTPATIENT)
Dept: FAMILY MEDICINE | Facility: CLINIC | Age: 23
End: 2023-08-08
Payer: COMMERCIAL

## 2023-08-08 DIAGNOSIS — F90.9 ATTENTION DEFICIT HYPERACTIVITY DISORDER (ADHD), UNSPECIFIED ADHD TYPE: Primary | ICD-10-CM

## 2023-08-16 ENCOUNTER — TELEPHONE (OUTPATIENT)
Dept: FAMILY MEDICINE | Facility: CLINIC | Age: 23
End: 2023-08-16
Payer: COMMERCIAL

## 2023-09-11 ENCOUNTER — VIRTUAL VISIT (OUTPATIENT)
Dept: PSYCHIATRY | Facility: CLINIC | Age: 23
End: 2023-09-11
Attending: FAMILY MEDICINE
Payer: COMMERCIAL

## 2023-09-11 VITALS — WEIGHT: 165 LBS | HEIGHT: 68 IN | BODY MASS INDEX: 25.01 KG/M2

## 2023-09-11 DIAGNOSIS — F60.9 CLUSTER B PERSONALITY DISORDER IN ADULT (H): ICD-10-CM

## 2023-09-11 DIAGNOSIS — F33.41 RECURRENT MAJOR DEPRESSIVE DISORDER, IN PARTIAL REMISSION (H): ICD-10-CM

## 2023-09-11 DIAGNOSIS — F43.10 PTSD (POST-TRAUMATIC STRESS DISORDER): ICD-10-CM

## 2023-09-11 DIAGNOSIS — F41.1 GENERALIZED ANXIETY DISORDER: ICD-10-CM

## 2023-09-11 DIAGNOSIS — F90.2 ADHD (ATTENTION DEFICIT HYPERACTIVITY DISORDER), COMBINED TYPE: Primary | ICD-10-CM

## 2023-09-11 PROCEDURE — 99205 OFFICE O/P NEW HI 60 MIN: CPT | Mod: VID | Performed by: NURSE PRACTITIONER

## 2023-09-11 ASSESSMENT — PATIENT HEALTH QUESTIONNAIRE - PHQ9
SUM OF ALL RESPONSES TO PHQ QUESTIONS 1-9: 2
10. IF YOU CHECKED OFF ANY PROBLEMS, HOW DIFFICULT HAVE THESE PROBLEMS MADE IT FOR YOU TO DO YOUR WORK, TAKE CARE OF THINGS AT HOME, OR GET ALONG WITH OTHER PEOPLE: NOT DIFFICULT AT ALL
SUM OF ALL RESPONSES TO PHQ QUESTIONS 1-9: 2

## 2023-09-11 ASSESSMENT — PAIN SCALES - GENERAL: PAINLEVEL: NO PAIN (0)

## 2023-09-11 NOTE — PATIENT INSTRUCTIONS
Treatment plan today   Follow up in 4 weeks (or sooner as needed)  Medication changes   -Adderall XR 15 mg by mouth every morning  -Sertraline 100 mg tablet take 2 tablets by mouth every day  -Trazodone 100 mg tablet take 1 tablet by mouth every bedtime as needed for sleep  Complete urine drug screen.  This has been ordered by your primary care provider already.  Call to schedule follow-up appointment with me once you know your work schedule  Referral for therapy has been placed through [facility]  Read the following regarding borderline personality disorder when you have a chance. [email or web address].nih.gov/health/topics/borderline-personality-disorder  Communication  Call the psychiatric nurse line with medication questions or concerns at [phone] or [phone].  FOODSCROOGE may be used to communicate with your care team, but this is not intended to be used for emergencies.  You can call the above number to make appointments, leave a message with our nursing team, and inquire about any mental health referrals I have placed.  Please call your pharmacy to request a refill of your medications listed above if needed between appointments.   Safety Plan - see below for crisis resources   Call or text [phone] for mental health crisis.   Call 911 or use ER for potentially life-threatening situations.     EUSEBIA Patten, PMHNP  Panel Psychiatry Service   [facility]         RESOURCES     Crisis Resources  For emergency help, please call 911 or go to the nearest Emergency Department.     Emergency Walk-In Options:   EmPATH Unit @ [facility] ([place]): [phone] - Specialized mental health emergency area designed to be calming  [facility] ([place]): [phone]  [facility] ([place]): [phone]  [facility]): [phone]    [facility] Crisis Information:   [place]: [phone]  [place]: [phone]  [place] (TIFFANY) - Adult:  [phone]     Child: [phone]  [place] - Adult: [phone]     Child: [phone]  [place]: [phone]  [facility], [place], [facility], [facility], and the [facility]: [phone] or TEXT  [place]  to [phone]  List of all [facility] resources:   https://[place].gov/dhs/people-we-serve/adults/health-care/mental-health/resources/crisis-contacts.jsp    National Crisis Information:   National Suicide & Crisis Lifeline: Call 988   For online chat options, visit https://suicidepreventionlifeline.org/chat/  Poison Control Center: 6-596-340-0324  Poison Control Center: 3-055-920-5848  Trans Lifeline: 1-934.362.8272 - Hotline for transgender people of all ages  The Alejandro Project: 1-571.887.5515 - Hotline for LGBT youth     For Non-Emergency Support:   Fast Tracker: Mental Health & Substance Use Disorder Resources -   https://www.[employer].org/    Additional Resources  Financial Assistance [phone]  [facility] Billing [phone]  Central Billing Office, [facility]: [phone]  [facility] Billing [phone]  Medical Records [phone]  [facility] Patient Bill of Rights [email or web address]         Patient Education   The Panel Psychiatry Program  What to Expect  Here's what to expect in the Panel Psychiatry Program.   About the program  You'll be meeting with a psychiatric doctor to check your mental health. A psychiatric doctor helps you deal with troubling thoughts and feelings by giving you medicine. They'll make sure you know the plan for your care. You may see them for a long time. When you're feeling better, they may refer you back to seeing your family doctor.   If you have any questions, we'll be glad to talk to you.  About visits  Be open  At your visits, please talk openly about your problems. It may feel hard, but it's the best way for us to help you.  Cancelling visits  If you can't come to your visit, please call  "us right away at [phone]. If you don't cancel at least 24 hours (1 full day) before your visit, that's \"late cancellation.\"  Not showing up for your visits  Being very late is the same as not showing up. You'll be a \"no show\" if:  You're more than 15 minutes late for a 30-minute (half hour) visit.  You're more than 30 minutes late for a 60-minute (full hour) visit.  If you cancel late or don't show up 2 times within 6 months, we may end your care.  Getting help between visits  If you need help between visits, you can call us Monday to Friday from 8 a.m. to 4:30 p.m. at [phone].  Emergency care  Call 911 or go to the nearest emergency department if your life or someone else's life is in danger.  Call 988 anytime to reach the national Suicide and Crisis hotline.  Medicine refills  To refill your medicine, call your pharmacy. You can also call [facility]'s [facility] at [phone], Monday to Friday, 8 a.m. to 4:30 p.m. It can take 1 to 3 business days to get a refill.   Forms, letters, and tests  You may have papers to fill out, like FMLA, short-term disability, and workability. We can help you with these forms at your visits, but you must have an appointment. You may need more than 1 visit for this, to be in an intensive therapy program, or both.  Before we can give you medicine for ADHD, we may refer you to get tested for it or confirm it another way.  We may not be able to give you an emotional support animal letter.  We don't do mental health checks ordered by the court.   We don't do mental health testing, but we can refer you to get tested.   Thank you for choosing us for your care.  For informational purposes only. Not to replace the advice of your health care provider. Copyright   2022 [facility]. All rights reserved. [employer] 208997 - 12/22.       "
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
## 2023-09-11 NOTE — NURSING NOTE
Is the patient currently in the state of [place]? YES    Visit mode:VIDEO    If the visit is dropped, the patient can be reconnected by: VIDEO VISIT: Text to cell phone:   Telephone Information:   Mobile [phone]       Will anyone else be joining the visit? No  (If patient encounters technical issues they should call [phone])    How would you like to obtain your AVS? MyChart    Are changes needed to the allergy or medication list? Pt stated no changes to allergies and Pt stated no med changes    Rooming Documentation: Assigned questionnaire(s) completed . and Attendance Guidelines - Care team has reviewed attendance agreement with patient. Patient advised that two failed appointments within 6 months may lead to termination of current episode of care.      Reason for visit: Consult     ART Chu

## 2023-09-13 ENCOUNTER — LAB (OUTPATIENT)
Dept: LAB | Facility: CLINIC | Age: 23
End: 2023-09-13
Payer: COMMERCIAL

## 2023-09-13 DIAGNOSIS — F90.9 ATTENTION DEFICIT HYPERACTIVITY DISORDER (ADHD), UNSPECIFIED ADHD TYPE: ICD-10-CM

## 2023-09-13 LAB
AMPHETAMINES UR QL: DETECTED
BARBITURATES UR QL SCN: NOT DETECTED
BENZODIAZ UR QL SCN: NOT DETECTED
BUPRENORPHINE UR QL: NOT DETECTED
CANNABINOIDS UR QL: NOT DETECTED
COCAINE UR QL SCN: NOT DETECTED
D-METHAMPHET UR QL: NOT DETECTED
METHADONE UR QL SCN: NOT DETECTED
OPIATES UR QL SCN: NOT DETECTED
OXYCODONE UR QL SCN: NOT DETECTED
PCP UR QL SCN: NOT DETECTED
TRICYCLICS UR QL SCN: NOT DETECTED

## 2023-09-13 PROCEDURE — 80306 DRUG TEST PRSMV INSTRMNT: CPT

## 2023-12-05 NOTE — TELEPHONE ENCOUNTER
Reason for call:  Medication   If this is a refill request, has the caller requested the refill from the pharmacy already? Yes  Will the patient be using a [facility]? No  Name of the pharmacy and phone number for the current request: [facility]    Name of the medication requested: Adderall    Other request: N/A    Phone number to reach patient:  Home number on file [phone] (home)    Best Time:  ASAP    Can we leave a detailed message on this number?  YES    Travel screening: Not Applicable

## 2023-12-06 RX ORDER — DEXTROAMPHETAMINE SACCHARATE, AMPHETAMINE ASPARTATE, DEXTROAMPHETAMINE SULFATE AND AMPHETAMINE SULFATE 3.75; 3.75; 3.75; 3.75 MG/1; MG/1; MG/1; MG/1
15 TABLET ORAL DAILY
Qty: 30 TABLET | Refills: 0 | OUTPATIENT
Start: 2023-12-06

## 2023-12-06 NOTE — TELEPHONE ENCOUNTER
RN attempted to call the pt to inform that her refill request was denied d/t inconsistent refills and needing to be seen.  The patient did not answer, RN LVM for the pt indicating the above information and asked her to call [phone] to schedule an appointment sooner than 1/18/2024.    Amanda Cho RN on 12/6/2023 at 9:43 AM

## 2023-12-06 NOTE — TELEPHONE ENCOUNTER
Refill denied. Needs to be seen. Inconsistent fills of Adderall.   Per  filled 08/01/23 and 10/30/23. Needs to follow-up.   EUSEBIA Pichardo CNP on 12/6/2023 at 9:33 AM

## 2023-12-06 NOTE — TELEPHONE ENCOUNTER
Date of Last Office Visit: 9/11/2023  Date of Next Office Visit: 1/18/2024  No shows since last visit: na  Cancellations since last visit: na    Medication requested: amphetamine-dextroamphetamine (ADDERALL) 15 MG tablet  Date last ordered: We have never ordered this medication for the pt, it has only been recorded       Review of [place] ?: Yes  Medication last sold date: 10/30/23 Qty filled: #30 filled by provider Cristina Xiong  Other controlled substance on [place] ?: NO  If yes, is this a new medication?: na  If yes, name of medication: na and date filled: na    Lapse in medication adherence greater than 5 days?: Na for this medication   If yes, call patient and gather details: na  Medication refill request verified as identical to current order?: Per treatment plan  Result of Last DAM, VPA, Li+ Level, CBC, or Carbamazepine Level (at or since last visit): Positive for amphetamines on 9/13/2023 UDS    Last visit treatment plan:   Assessment      Cindi Armas is 22 year old female (male to female transgender individual) with a past psychiatry history of ADHD, MDD, borderline personality disorder, PTSD who has been referred for medication management from primary care provider, Dr. Faust.  Patient has 5 previous psychiatric admissions.  One occurred in [place] as an adolescent and 4 through [facility] with the last admission in 2021.  1 previous disclosed suicide attempt via cutting.  Denies suicidal ideation with last occurring in August 2021 per patient.  History of self harming behaviors of cutting that began as an adolescent and continued until August 2021.  No overt concerns regarding chemical health.  Does disclose that some were concerned about alcohol use in 2021.  Did experiment with 2 Percocet on 1 occasion in 2021.  Denies substance use currently.  Urine drug screen was positive for opiates in August 2023.  Per patient she attributes this to eating a bagel with poppyseed on it and is agreeable to another  drug screen.  Reports being fearful of using any illicit substances including marijuana and fear of jeopardizing her career.  We did discuss termination from [facility] clinic due to missed appointments.  Attempting to be more consistent now that she is done with training.     Meets criteria for MDD which is currently in partial to full remission.  Meets criteria for ADHD.  Meets criteria for ARIELLE with history of panic attacks.  Consideration to social anxiety disorder.  History of PTSD which we will continue to explore history of borderline personality disorder.  There are certainly cluster B traits however we will continue to explore the possibility of borderline personality disorder.  We will provide [facility] handout regarding borderline personality disorder for patient to read.  No imminent safety concerns today.     Reviewed the following records:  -Psychiatric progress note from [facility], 1/16/2023  -Psychiatric eval from [facility], 11/29/2018  -Psychiatric discharge note from inpatient, [facility], 11/23/2018  -Psychiatric discharge note from inpatient, [facility], 8/9/2021     Pharmacologic:   -Continue Adderall XR 15 mg by mouth every morning.  Patient has 1 additional prescription remaining from primary care provider therefore no additional prescription will be sent until urine drug screen completed and seen in clinic again.     -Continue sertraline 100 mg tablet, take 2 tablets by mouth every day     -Continue trazodone 100 mg take 1 tablet by mouth at bedtime as needed.  Use is rare of this medication        Psychosocial: Would benefit from individual therapy with focus of Dialectical Behavior Therapy (DBT). DBT would be helpful in addressing emotional regulation, distress tolerance, mindfulness, and interpersonal effectiveness.    -Patient is interested in individual therapy and requested referral for [facility] provider         []Medication refilled per  Medication Refill in Ambulatory Care  policy.  [x]Medication unable to be refilled  by RN due to criteria not met as indicated below:    []Eligibility - not seen in the last year   []Supervision - no future appointment   []Compliance - no shows, cancellations or lapse in therapy   []Verification - order discrepancy   [x]Controlled medication   []Medication not included in policy   []90-day supply request   []Other    A 30 day supply was pended for provider for review.    Amanda Cho RN on 12/6/2023 at 7:41 AM

## 2023-12-18 ENCOUNTER — TELEPHONE (OUTPATIENT)
Dept: PSYCHIATRY | Facility: CLINIC | Age: 23
End: 2023-12-18
Payer: COMMERCIAL

## 2023-12-18 ENCOUNTER — VIRTUAL VISIT (OUTPATIENT)
Dept: PSYCHOLOGY | Facility: CLINIC | Age: 23
End: 2023-12-18
Attending: NURSE PRACTITIONER
Payer: COMMERCIAL

## 2023-12-18 DIAGNOSIS — F33.41 RECURRENT MAJOR DEPRESSIVE DISORDER, IN PARTIAL REMISSION (H): ICD-10-CM

## 2023-12-18 DIAGNOSIS — F41.1 GENERALIZED ANXIETY DISORDER: ICD-10-CM

## 2023-12-18 DIAGNOSIS — F90.2 ADHD (ATTENTION DEFICIT HYPERACTIVITY DISORDER), COMBINED TYPE: ICD-10-CM

## 2023-12-18 DIAGNOSIS — F60.9 CLUSTER B PERSONALITY DISORDER IN ADULT (H): ICD-10-CM

## 2023-12-18 DIAGNOSIS — F43.10 PTSD (POST-TRAUMATIC STRESS DISORDER): ICD-10-CM

## 2023-12-18 PROCEDURE — 99207 PR NO BILLABLE SERVICE THIS VISIT: CPT | Performed by: COUNSELOR

## 2023-12-18 ASSESSMENT — PATIENT HEALTH QUESTIONNAIRE - PHQ9
SUM OF ALL RESPONSES TO PHQ QUESTIONS 1-9: 1
10. IF YOU CHECKED OFF ANY PROBLEMS, HOW DIFFICULT HAVE THESE PROBLEMS MADE IT FOR YOU TO DO YOUR WORK, TAKE CARE OF THINGS AT HOME, OR GET ALONG WITH OTHER PEOPLE: NOT DIFFICULT AT ALL
SUM OF ALL RESPONSES TO PHQ QUESTIONS 1-9: 1

## 2023-12-18 NOTE — PROGRESS NOTES
Writer and pt met briefly. Pt noted she was not currently looking for individual therapy and thought that this apt was for psychiatric medication management. Gui apologized for the confusion and we discussed her upcoming apt with psychiatry on 1/18. Writer did provide pt with phone number to contact [facility] team in order to get in earlier ([facility] Pt thanked writer and session ended. Pt has declined individual therapy through [facility] at this time.

## 2023-12-18 NOTE — TELEPHONE ENCOUNTER
Reason for call:  Medication   If this is a refill request, has the caller requested the refill from the pharmacy already? N/A  Will the patient be using a [facility]? N/A  Name of the pharmacy and phone number for the current request: [facility] in [place]     Name of the medication requested: adrell b rx 15 mg     Other request: na     Phone number to reach patient:  Home number on file [phone] (home)    Best Time:  any     Can we leave a detailed message on this number?  YES    Travel screening: Not Applicable   
Received notification that the patient requested a refill of Adderall.     Per review of medical record and 12/5/23 refill encounter, Leticia Schuler did not approve refills.         RN attempted to call the patient on 12/6/23 but there was no answer and no return call.     This RN phoned the patient and discussed leticia Schuler's directives. She was disappointment but reported understanding.   
13.58

## 2023-12-27 ENCOUNTER — VIRTUAL VISIT (OUTPATIENT)
Dept: FAMILY MEDICINE | Facility: CLINIC | Age: 23
End: 2023-12-27
Payer: COMMERCIAL

## 2023-12-27 DIAGNOSIS — Z78.9 MALE-TO-FEMALE TRANSGENDER PERSON: ICD-10-CM

## 2023-12-27 DIAGNOSIS — F43.10 PTSD (POST-TRAUMATIC STRESS DISORDER): ICD-10-CM

## 2023-12-27 DIAGNOSIS — F41.1 GENERALIZED ANXIETY DISORDER: ICD-10-CM

## 2023-12-27 DIAGNOSIS — F90.9 ATTENTION DEFICIT HYPERACTIVITY DISORDER (ADHD), UNSPECIFIED ADHD TYPE: Primary | ICD-10-CM

## 2023-12-27 DIAGNOSIS — F33.41 RECURRENT MAJOR DEPRESSIVE DISORDER, IN PARTIAL REMISSION (H): ICD-10-CM

## 2023-12-27 DIAGNOSIS — L70.0 ACNE VULGARIS: ICD-10-CM

## 2023-12-27 PROCEDURE — 99214 OFFICE O/P EST MOD 30 MIN: CPT | Mod: VID | Performed by: FAMILY MEDICINE

## 2023-12-27 RX ORDER — DEXTROAMPHETAMINE SACCHARATE, AMPHETAMINE ASPARTATE, DEXTROAMPHETAMINE SULFATE AND AMPHETAMINE SULFATE 3.75; 3.75; 3.75; 3.75 MG/1; MG/1; MG/1; MG/1
15 TABLET ORAL 2 TIMES DAILY
Qty: 60 TABLET | Refills: 0 | Status: SHIPPED | OUTPATIENT
Start: 2023-12-27 | End: 2024-03-08

## 2023-12-27 NOTE — PROGRESS NOTES
Cindi is a 23 year old who is being evaluated via a billable video visit.      How would you like to obtain your AVS? MyChart  If the video visit is dropped, the invitation should be resent by: Text to cell phone: [phone]  Will anyone else be joining your video visit? No          Assessment & Plan     Attention deficit hyperactivity disorder (ADHD), unspecified ADHD type  I provided a 1 month refill of Adderall to cover her until she sees her psychiatric provider next month.  - amphetamine-dextroamphetamine (ADDERALL) 15 MG tablet; Take 1 tablet (15 mg) by mouth 2 times daily    Generalized anxiety disorder  Mental health symptoms are stable on current medications.    Recurrent major depressive disorder, in partial remission (H24)  Mental health symptoms are stable on current medications.    PTSD (post-traumatic stress disorder)    Male-to-female transgender person  Stable on estradiol.    Acne vulgaris  She was given a referral to dermatology for further evaluation and to discuss treatment of the acne symptoms.  - Adult Dermatology  Referral; Future                     Kyle Nunez, [facility]   Cindi is a 23 year old, presenting for the following health issues:  A.D.H.D and Referral (Dermatology)        12/27/2023    10:48 AM   Additional Questions   Roomed by Ludy PELAEZ       HPI     ADHD Follow-Up    Date of last ADHD office visit: 8/1/23  Status since last visit: Stable  Taking controlled (daily) medications as prescribed: No  - needs refill                     Parent/Patient Concerns with Medications: Can feel angsty/short tempered at the end of a long day  ADHD Medication       Amphetamines Disp Start End     amphetamine-dextroamphetamine (ADDERALL) 15 MG tablet --  --    Sig - Route: Take 15 mg by mouth 2 times daily - Oral    Class: Historical          She has a history significant for gender dysphoria (male to female transgender), ADHD, major  recurrent depression, generalized anxiety disorder, panic attacks and PTSD.  She has been seeing a provider for refills of her mental health medications, but she is not able to get in with them until January 18 and she is due for a refill of Adderall.  She feels like this medication is helping a lot with focus and distractibility without significant unwanted side effects.  She also feels like her anxiety and depression symptoms are stable.     She has been struggling with acne symptoms, some of which are cystic in appearance and causing scarring.  She would like to see a dermatologist.            Review of Systems   Constitutional, HEENT, cardiovascular, pulmonary, GI, , musculoskeletal, neuro, skin, endocrine and psych systems are negative, except as otherwise noted.      Objective           Vitals:  No vitals were obtained today due to virtual visit.    Physical Exam   GENERAL: Healthy, alert and no distress  EYES: Eyes grossly normal to inspection.  No discharge or erythema, or obvious scleral/conjunctival abnormalities.  RESP: No audible wheeze, cough, or visible cyanosis.  No visible retractions or increased work of breathing.    SKIN: Visible skin clear. No significant rash, abnormal pigmentation or lesions.  NEURO: Cranial nerves grossly intact.  Mentation and speech appropriate for age.  PSYCH: Mentation appears normal, affect normal/bright, judgement and insight intact, normal speech and appearance well-groomed.                Video-Visit Details    Type of service:  Video Visit     Originating Location (pt. Location): Home    Distant Location (provider location):  On-site  Platform used for Video Visit: [employer]

## 2024-01-18 ENCOUNTER — VIRTUAL VISIT (OUTPATIENT)
Dept: PSYCHIATRY | Facility: CLINIC | Age: 24
End: 2024-01-18
Payer: COMMERCIAL

## 2024-01-18 DIAGNOSIS — F41.1 GENERALIZED ANXIETY DISORDER: ICD-10-CM

## 2024-01-18 DIAGNOSIS — F60.9 CLUSTER B PERSONALITY DISORDER IN ADULT (H): ICD-10-CM

## 2024-01-18 DIAGNOSIS — F43.10 PTSD (POST-TRAUMATIC STRESS DISORDER): ICD-10-CM

## 2024-01-18 DIAGNOSIS — F90.2 ADHD (ATTENTION DEFICIT HYPERACTIVITY DISORDER), COMBINED TYPE: Primary | ICD-10-CM

## 2024-01-18 DIAGNOSIS — F33.0 MDD (MAJOR DEPRESSIVE DISORDER), RECURRENT EPISODE, MILD (H): ICD-10-CM

## 2024-01-18 DIAGNOSIS — F64.9 GENDER DYSPHORIA: ICD-10-CM

## 2024-01-18 PROCEDURE — 99214 OFFICE O/P EST MOD 30 MIN: CPT | Mod: 95 | Performed by: NURSE PRACTITIONER

## 2024-01-18 ASSESSMENT — PATIENT HEALTH QUESTIONNAIRE - PHQ9
10. IF YOU CHECKED OFF ANY PROBLEMS, HOW DIFFICULT HAVE THESE PROBLEMS MADE IT FOR YOU TO DO YOUR WORK, TAKE CARE OF THINGS AT HOME, OR GET ALONG WITH OTHER PEOPLE: SOMEWHAT DIFFICULT
SUM OF ALL RESPONSES TO PHQ QUESTIONS 1-9: 15
SUM OF ALL RESPONSES TO PHQ QUESTIONS 1-9: 15

## 2024-01-18 ASSESSMENT — ANXIETY QUESTIONNAIRES
GAD7 TOTAL SCORE: 16
7. FEELING AFRAID AS IF SOMETHING AWFUL MIGHT HAPPEN: MORE THAN HALF THE DAYS
GAD7 TOTAL SCORE: 16
3. WORRYING TOO MUCH ABOUT DIFFERENT THINGS: MORE THAN HALF THE DAYS
2. NOT BEING ABLE TO STOP OR CONTROL WORRYING: NEARLY EVERY DAY
7. FEELING AFRAID AS IF SOMETHING AWFUL MIGHT HAPPEN: MORE THAN HALF THE DAYS
GAD7 TOTAL SCORE: 16
5. BEING SO RESTLESS THAT IT IS HARD TO SIT STILL: MORE THAN HALF THE DAYS
1. FEELING NERVOUS, ANXIOUS, OR ON EDGE: MORE THAN HALF THE DAYS
8. IF YOU CHECKED OFF ANY PROBLEMS, HOW DIFFICULT HAVE THESE MADE IT FOR YOU TO DO YOUR WORK, TAKE CARE OF THINGS AT HOME, OR GET ALONG WITH OTHER PEOPLE?: SOMEWHAT DIFFICULT
4. TROUBLE RELAXING: MORE THAN HALF THE DAYS
6. BECOMING EASILY ANNOYED OR IRRITABLE: NEARLY EVERY DAY
IF YOU CHECKED OFF ANY PROBLEMS ON THIS QUESTIONNAIRE, HOW DIFFICULT HAVE THESE PROBLEMS MADE IT FOR YOU TO DO YOUR WORK, TAKE CARE OF THINGS AT HOME, OR GET ALONG WITH OTHER PEOPLE: SOMEWHAT DIFFICULT

## 2024-01-18 ASSESSMENT — PAIN SCALES - GENERAL: PAINLEVEL: NO PAIN (0)

## 2024-01-18 NOTE — LETTER
February 8, 2024      Cindi Chica Pawel  [id number]  [place] [address]        To Whom It May Concern,     I am the treating provider for Cindi Armas.  They have been under my care since 9/11/2023.  They do meet criteria for gender dysphoria.         Sincerely,        EUSEBIA Pichardo CNP

## 2024-01-18 NOTE — PROGRESS NOTES
Virtual Visit Details    Type of service:  Video Visit     Originating Location (pt. Location): Home, [place]    Distant Location (provider location):  On-site  Platform used for Video Visit: [facility]        OUTPATIENT PSYCHIATRIC FOLLOW-UP      2024    Provider: EUSEBIA Pichardo CNP      Appointment Start Time: 229p  Appointment End Time: 256p     Name: Cindi Armas   : 2000                    Preferred Name: Cindi      Screening Tools           2024     2:15 PM 2023    10:54 AM 2023     7:43 AM   PHQ   PHQ-9 Total Score 15 1 2   Q9: Thoughts of better off dead/self-harm past 2 weeks Not at all Not at all Not at all         2024     2:16 PM 2023     9:52 AM   ARIELLE-7 SCORE   Total Score 16 (severe anxiety) 18 (severe anxiety)   Total Score 16 18     The following assessments were completed by patient for this visit:  PROMIS 10-Global Health (all questions and answers displayed):       2023     7:57 AM 2023    10:55 AM 2024     2:18 PM   PROMIS 10   In general, would you say your health is: Good Good Fair   In general, would you say your quality of life is: Very good Excellent Fair   In general, how would you rate your physical health? Very good Excellent Fair   In general, how would you rate your mental health, including your mood and your ability to think? Fair Excellent Fair   In general, how would you rate your satisfaction with your social activities and relationships? Very good Excellent Good   In general, please rate how well you carry out your usual social activities and roles Good Excellent Fair   To what extent are you able to carry out your everyday physical activities such as walking, climbing stairs, carrying groceries, or moving a chair? Moderately Completely Moderately   In the past 7 days, how often have you been bothered by emotional problems such as feeling anxious, depressed, or irritable? Sometimes Never Sometimes   In the past 7 days, how  "would you rate your fatigue on average? Mild None Moderate   In the past 7 days, how would you rate your pain on average, where 0 means no pain, and 10 means worst imaginable pain? 0 0 0   In general, would you say your health is: 3 3 2   In general, would you say your quality of life is: 4 5 2   In general, how would you rate your physical health? 4 5 2   In general, how would you rate your mental health, including your mood and your ability to think? 2 5 2   In general, how would you rate your satisfaction with your social activities and relationships? 4 5 3   In general, please rate how well you carry out your usual social activities and roles. (This includes activities at home, at work and in your community, and responsibilities as a parent, child, spouse, employee, friend, etc.) 3 5 2   To what extent are you able to carry out your everyday physical activities such as walking, climbing stairs, carrying groceries, or moving a chair? 3 5 3   In the past 7 days, how often have you been bothered by emotional problems such as feeling anxious, depressed, or irritable? 3 1 3   In the past 7 days, how would you rate your fatigue on average? 2 1 3   In the past 7 days, how would you rate your pain on average, where 0 means no pain, and 10 means worst imaginable pain? 0 0 0   Global Mental Health Score 13 20 10   Global Physical Health Score 16 20 13   PROMIS TOTAL - SUBSCORES 29 40 23          History of Present Illness      Patient attended the session alone.     Interim History:  I last saw Cindi Armas for outpatient psychiatry Consultation on 09/11/23. During that appointment, we established care and continued plan.  .     Current stressors include: Symptoms     Coping mechanisms and supports include: Hobbies    Side effects: Denies    Medication adherence: Reports good med adherence.    Psychiatric Review of Systems      Cindi Armas reports mood has been: \"Okay\".     Depression has been: Does note " that winter impacting mood. More irritable. Struggling more with meal prepping, laundry. More procrastination. Feels when she is typically routine with medication the severity of these sx are not to this extent    Anxiety has been: More overwhelmed since change of . Doesn't feel it is related to ARIELLE sx.     Sleep has been: Trouble with sleep hygiene routine. Lays there awake for 30 minutes then returns to phone. Not having trouble waking.     Allison sxs: Denies    Psychosis sxs: Denies    ADHD sxs: New prescription at end of December. Different . Feels impact on mood. Less focus, more overwhelmed, irritable. Lost work device which is a significant deal - isn't sure if this is related to ADHD med /  vs. Depression. Difficulty with prioritizing     PTSD sxs: Denies    SI/SIB: Sense of hopelessness. Doesn't feel she is meeting her goals. Denies SI, no plan or intent.       Medications Prior to Appointment       Current Outpatient Medications   Medication Sig Dispense Refill    amphetamine-dextroamphetamine (ADDERALL) 15 MG tablet Take 1 tablet (15 mg) by mouth 2 times daily 60 tablet 0    estradiol (ESTRACE) 1 MG tablet Take 1 tablet (1 mg) by mouth daily 90 tablet 1    sertraline (ZOLOFT) 100 MG tablet Take 2 tablets (200 mg) by mouth daily 180 tablet 1    traZODone (DESYREL) 100 MG tablet Take 1 tablet (100 mg) by mouth At Bedtime 90 tablet 1        Previous medication trials include but not limited to:  - Abilify   - Adderall XR  - Concerta  - Strattera, ineffective  - minipress  - trazodone  - sertraline  - bupropion, increased anxiety                    Medication Compliance: No                 Pharmacogenomic Testing Completed: No      Medical History      History of head injuries: No  History of seizures: No  History of cardiac events: No  History of Tardive Dyskinesia: No         No past medical history on file.     Surgery: No past surgical history on file.  Primary Care  Provider: Physician No Ref-Primary        Social History      Current Living situation:  [place], [place] with Roommate.    Current use of drugs or alcohol: Denies     Tobacco use: No    Employment: Yes:      Relationship Status: single     Vitals      Not obtained d/t virtual visit.     LMP  (LMP Unknown)     Labs        Most recent laboratory results reviewed and pertinent results include:   Lab on 09/13/2023   Component Date Value Ref Range Status    Cannabinoids (75-vaj-1-carboxy-9-T* 09/13/2023 Not Detected  Not Detected, Indeterminate Final    Cutoff for a negative cannabinoid is 50 ng/mL or less.    Phencyclidine 09/13/2023 Not Detected  Not Detected, Indeterminate Final    Cutoff for a negative PCP is 25 ng/mL or less.    Cocaine (Benzoylecgonine) 09/13/2023 Not Detected  Not Detected, Indeterminate Final    Cutoff for a negative cocaine is 150 ng/ml or less.    Methamphetamine (d-Methamphetamine) 09/13/2023 Not Detected  Not Detected, Indeterminate Final    Cutoff for a negative methamphetamine is 500 ng/ml or less.    Opiates (Morphine) 09/13/2023 Not Detected  Not Detected, Indeterminate Final    Cutoff for a negative opiate is 100 ng/ml or less.    Amphetamine (d-Amphetamine) 09/13/2023 Detected (A)  Not Detected, Indeterminate Final    Cutoff for a positive amphetamine is greater than 500 ng/ml.  This is an unconfirmed screening result to be used for medical purposes only.     Benzodiazepines (Nordiazepam) 09/13/2023 Not Detected  Not Detected, Indeterminate Final    Cutoff for a negative benzodiazepine is 150 ng/ml or less.    Tricyclic Antidepressants (Desipra* 09/13/2023 Not Detected  Not Detected, Indeterminate Final    Cutoff for a negative tricyclic antidepressant is 300 ng/ml or less.    Methadone 09/13/2023 Not Detected  Not Detected, Indeterminate Final    Cutoff for a negative methadone is 200 ng/ml or less.    Barbiturates (Butalbital) 09/13/2023 Not Detected  Not Detected,  Indeterminate Final    Cutoff for a negative barbituate is 200 ng/ml or less.    Oxycodone 09/13/2023 Not Detected  Not Detected, Indeterminate Final    Cutoff for a negative oxycodone is 100 ng/mL or less.    Buprenorphine 09/13/2023 Not Detected  Not Detected, Indeterminate Final    Cutoff for a negative buprenorphine is 10 ng/ml or less.   Office Visit on 08/01/2023   Component Date Value Ref Range Status    HIV Antigen Antibody Combo 08/01/2023 Nonreactive  Nonreactive Final    HIV-1 p24 Ag & HIV-1/HIV-2 Ab Not Detected    Hepatitis C Antibody 08/01/2023 Nonreactive  Nonreactive Final    Treponema Antibody Total 08/01/2023 Nonreactive  Nonreactive Final    Sodium 08/01/2023 139  136 - 145 mmol/L Final    Potassium 08/01/2023 4.1  3.4 - 5.3 mmol/L Final    Chloride 08/01/2023 103  98 - 107 mmol/L Final    Carbon Dioxide (CO2) 08/01/2023 26  22 - 29 mmol/L Final    Anion Gap 08/01/2023 10  7 - 15 mmol/L Final    Urea Nitrogen 08/01/2023 12.6  6.0 - 20.0 mg/dL Final    Creatinine 08/01/2023 0.58  0.51 - 0.95 mg/dL Final    Calcium 08/01/2023 9.0  8.6 - 10.0 mg/dL Final    Glucose 08/01/2023 93  70 - 99 mg/dL Final    Alkaline Phosphatase 08/01/2023 78  35 - 104 U/L Final    AST 08/01/2023 19  0 - 45 U/L Final    Reference intervals for this test were updated on 6/12/2023 to more accurately reflect our healthy population. There may be differences in the flagging of prior results with similar values performed with this method. Interpretation of those prior results can be made in the context of the updated reference intervals.    ALT 08/01/2023 11  0 - 50 U/L Final    Reference intervals for this test were updated on 6/12/2023 to more accurately reflect our healthy population. There may be differences in the flagging of prior results with similar values performed with this method. Interpretation of those prior results can be made in the context of the updated reference intervals.      Protein Total 08/01/2023 7.1  6.4  - 8.3 g/dL Final    Albumin 08/01/2023 4.4  3.5 - 5.2 g/dL Final    Bilirubin Total 08/01/2023 0.5  <=1.2 mg/dL Final    GFR Estimate 08/01/2023 >90  >60 mL/min/1.73m2 Final    Estradiol 08/01/2023 <5  pg/mL Final    Healthy Men:   11.3-43.2 pg/mL    Healthy Postmenopausal Women:  Postmenopause: <5-138 pg/mL    Healthy Pregnant Women:  1st trimester: 154-3243 pg/mL  2nd trimester: 1561-50819 pg/mL  3rd trimester: 8525->21657 pg/mL    Healthy Women Cycle Phase:  Follicular: 30.9-90.4 pg/mL  Ovulation: 60.4-533 pg/mL  Luteal: 60.4-232 pg/mL    Healthy Women Cycle Sub-Phase:  Early Follicular: 20.5-62.8 pg/mL  Intermediate Follicular: 26-79.8 pg/mL  Late Follicular: 49.5-233 pg/mL  Ovulation: 60.4-602 pg/mL  Early Luteal: 51.1-179 pg/mL  Intermediate Luteal: 66.5-305 pg/mL  Late Luteal: 30.2-222 pg/mL    WBC Count 08/01/2023 5.8  4.0 - 11.0 10e3/uL Final    RBC Count 08/01/2023 4.70  3.80 - 5.20 10e6/uL Final    Hemoglobin 08/01/2023 13.5  11.7 - 15.7 g/dL Final    Hematocrit 08/01/2023 39.7  35.0 - 47.0 % Final    MCV 08/01/2023 85  78 - 100 fL Final    MCH 08/01/2023 28.7  26.5 - 33.0 pg Final    MCHC 08/01/2023 34.0  31.5 - 36.5 g/dL Final    RDW 08/01/2023 13.2  10.0 - 15.0 % Final    Platelet Count 08/01/2023 225  150 - 450 10e3/uL Final    Cholesterol 08/01/2023 169  <200 mg/dL Final    Triglycerides 08/01/2023 89  <150 mg/dL Final    Direct Measure HDL 08/01/2023 62  >=50 mg/dL Final    LDL Cholesterol Calculated 08/01/2023 89  <=100 mg/dL Final    Non HDL Cholesterol 08/01/2023 107  <130 mg/dL Final    Cannabinoids (83-dxh-6-carboxy-9-T* 08/01/2023 Not Detected  Not Detected, Indeterminate Final    Cutoff for a negative cannabinoid is 50 ng/mL or less.    Phencyclidine 08/01/2023 Not Detected  Not Detected, Indeterminate Final    Cutoff for a negative PCP is 25 ng/mL or less.    Cocaine (Benzoylecgonine) 08/01/2023 Not Detected  Not Detected, Indeterminate Final    Cutoff for a negative cocaine is 150 ng/ml or  less.    Methamphetamine (d-Methamphetamine) 08/01/2023 Not Detected  Not Detected, Indeterminate Final    Cutoff for a negative methamphetamine is 500 ng/ml or less.    Opiates (Morphine) 08/01/2023 Detected (A)  Not Detected, Indeterminate Final    Cutoff for a positive opiate is greater than 100 ng/ml.  This is an unconfirmed screening result to be used for medical purposes only.     Amphetamine (d-Amphetamine) 08/01/2023 Not Detected  Not Detected, Indeterminate Final    Cutoff for a negative amphetamine is 500 ng/mL or less.    Benzodiazepines (Nordiazepam) 08/01/2023 Not Detected  Not Detected, Indeterminate Final    Cutoff for a negative benzodiazepine is 150 ng/ml or less.    Tricyclic Antidepressants (Desipra* 08/01/2023 Not Detected  Not Detected, Indeterminate Final    Cutoff for a negative tricyclic antidepressant is 300 ng/ml or less.    Methadone 08/01/2023 Not Detected  Not Detected, Indeterminate Final    Cutoff for a negative methadone is 200 ng/ml or less.    Barbiturates (Butalbital) 08/01/2023 Not Detected  Not Detected, Indeterminate Final    Cutoff for a negative barbituate is 200 ng/ml or less.    Oxycodone 08/01/2023 Not Detected  Not Detected, Indeterminate Final    Cutoff for a negative oxycodone is 100 ng/mL or less.    Propoxyphene (Norpropoxyphene) 08/01/2023 Not Detected  Not Detected, Indeterminate Final    Cutoff for a negative propoxyphene is 300 ng/ml or less.    Buprenorphine 08/01/2023 Not Detected  Not Detected, Indeterminate Final    Cutoff for a negative buprenorphine is 10 ng/ml or less.    Sex Hormone Binding Globulin 08/01/2023 24 (L)  30 - 135 nmol/L Final    Free Testosterone Calculated 08/01/2023 0.15  ng/dL Final    Adult Female Reference Range:  18-30 Years: 0.08-0.74 ng/dL  31-40 Years: 0.13-0.92 ng/dL  41-51 Years: 0.11-0.58 ng/dL  Postmenopausal: 0.06-0.38 ng/dL    Testosterone Total 08/01/2023 7 (L)  8 - 60 ng/dL Final    Chlamydia Trachomatis 08/01/2023 Negative   "Negative Final    Negative for C. trachomatis rRNA by transcription mediated amplification.   A negative result by transcription mediated amplification does not preclude the presence of infection because results are dependent on proper and adequate collection, absence of inhibitors and sufficient rRNA to be detected.    Neisseria gonorrhoeae 08/01/2023 Negative  Negative Final    Negative for N. gonorrhoeae rRNA by transcription mediated amplification. A negative result by transcription mediated amplification does not preclude the presence of C. trachomatis infection because results are dependent on proper and adequate collection, absence of inhibitors and sufficient rRNA to be detected.     EKG: Most recent EKG from 2019 reviewed. QTc interval 423.          Medical Review of Systems      Pertinent positives noted in HPI and below:   Review of Systems   All other systems reviewed and are negative.       No LMP recorded (lmp unknown). (Menstrual status: Reassignment).         Mental Status Exam      Appearance: awake, alert, adequately groomed, appeared stated age and no apparent distress  Attitude:  cooperative   Eye Contact:  good  Gait and Station: normal, no gross abnormalities noted by observation  Psychomotor Behavior:  no evidence of tardive dyskinesia, dystonia, or tics  Oriented to:  person, place, time, and situation  Attention Span and Concentration:  normal  Speech:  clear, coherent, regular rate, rhythm, and volume  Language: intact  Mood: \"Tired\"  Affect:  appropriate and in normal range  Associations:  no loose associations  Thought Process:  logical, linear and goal oriented  Thought Content:  no evidence of suicidal ideation or homicidal ideation, no evidence of psychotic thought, no auditory hallucinations present and no visual hallucinations present  Recent and Remote Memory:  Intact to interview. Not formally assessed. No amnesia.  Fund of Knowledge: appropriate  Insight: Partial to " full  Judgment:  intact, adequate for safety  Impulse Control:  intact         Risk Assessment       Updated: 1/18/2024    Suicide assessment  Acute: Low  Chronic:Low  Imminent: Low     Risk factors  History of suicide attempts: Yes  History of self-injurious behavior: Yes  Terry. Axis I psychiatric diagnoses: Yes  Substance use disorder: No  Symptoms:  impulsivity  Family history of completed suicide or attempted suicide: Yes  Accessibility to firearms: No  Interpersonal factors: Financial stress, social stress, LGBQT+     Protective factors  Ability to cope with the stress: Yes  Family responsibility and supportive:Yes  Positive therapeutic relationships: No  Social support:Yes  Taoism beliefs:  No  Connectedness with mental health providers: Yes, establishing today     Homicidal Risk  Acute: Low  Chronic: Low  Imminent: Low       Assessment     Cindi Armas reports slight decrease in mood, increased irritability.  She has been more inconsistent with sertraline than she prefers.  Missing about 2 to 3 days/week.  Is going to get a pill organizer to help with this.  Also going to start setting a reminder for trazodone to take more routinely at bedtime to assist with sleep.  She is concerned that manufacture of current Adderall prescription may also be impacting symptoms.  She plans to contact pharmacy to determine what previous  she has used and will let provider know.  I will then send prescription with previous manufacture versus current.  No imminent safety concerns.  Patient to monitor symptoms closely.  We also discussed having an eating disorder assessment completed today.  Will send resources for Simin contreras and [place] for patient to contact.        Pharmacologic:    -Continue Adderall XR 15 mg by mouth every morning.  *Pt to call with , no rx sent today. Will send 3 Rxs when received     -Continue sertraline 100 mg tablet, take 2 tablets by mouth every day     -Continue  trazodone 100 mg take 1 tablet by mouth at bedtime as needed.  Use is rare of this medication        Psychosocial: Would benefit from individual therapy with focus of Dialectical Behavior Therapy (DBT). DBT would be helpful in addressing emotional regulation, distress tolerance, mindfulness, and interpersonal effectiveness.    -Patient is interested in individual therapy and requested referral for [facility] provider           Diagnosis       ADHD, combined presentation  ARIELLE with panic attacks  MDD, recurrent in partial to full remission  Cluster B traits     PTSD by history  Borderline personality disorder by history    Rule out eating d/o        Plan         1) Medications:    -Continue Adderall XR 15 mg by mouth every morning.  *Pt to call with , no rx sent today. Will send 3 Rxs when received     -Continue sertraline 100 mg tablet, take 2 tablets by mouth every day     -Continue trazodone 100 mg take 1 tablet by mouth at bedtime as needed.  Use is rare of this medication                 MNPMP: I have queried the [place] and/or [place] Prescription Monitoring Program for this patient for the preceding 12 months, or reviewed the report provided by my proxy delegate. I have not identified any concerns.  2) Risk vs benefits of medications reviewed: Yes  3) Life style modifications: sleep hygiene, exercise, healthy diet  4) Medical concerns:    -Urine drug screen to be completed, no refill provided until this is done  5) Other:   Recommend individual therapy, referral placed last appt   Recommend eating d/o assessment   6) Refrain from drinking alcohol and/or use of drugs.   7) Please secure all prescription and OTC medications, sharps, and caustic substances. Please remove all firearms and ammunition.  8) Review outside records, get GÓMEZ's, coordinate with outside providers  9) In case of emergency call 911 or go to the nearest ER, this includes patient voicing thought of harming self or others as well as additional  safety concerns   10) Follow-up: 6-8 weeks, or sooner if needed.      Administrative Billing:   Supportive therapy was provided, focusing on reflective listening and solution focused problem solving.    Total time preparing to see this patient, face-to-face time, documenting in the EHR, and coordinating care time on the same calendar date: 34 minutes         Signed:   Leticia Schuler DNP, APRN, PMHNP-BC....................  1/18/2024   2:28 PM     Disclaimer: This note consists of symbols derived from keyboarding, dictation and/or voice recognition software. As a result, there may be errors in the script that have gone undetected. Please consider this when interpreting information found in this chart.

## 2024-01-18 NOTE — LETTER
February 8, 2024      Cindi Armas  [id number]  [facility] [address]        To Whom It May Concern,     Cindi Armas is a 23-year-old female who resides in [place], [place].  This writer completed a mental health assessment with Cindi Armas on 9/11/2023 and 1/18/2024 via [facility] during a telehealth session(s).  This has been under the context of psychiatric care specifically medication management.    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 Although assigned male at birth, Cindi Armas has experienced incongruence between her internal sense of gender and her physical presentation for as long as they can remember.  She expressed a profound need for society to treat them as a female and identifies her secondary sex characteristics as a significant barrier to this.  This diagnosis of gender dysphoria has resulted in impairments to social and occupational functioning.    Cindi Armas has been prescribed various psychiatric medications since a young adolescent following the diagnosis of major depressive disorder. Cindi Armas remains on treatment. Cindi Armas has engaged in individual therapy for several years.  She is interested in returning to individual therapy at her most recent appointment and a referral was placed.  Overall symptoms are well-controlled with the current treatment regimen. Cindi Armas's other mental health diagnosis includes ADHD, combined presentation, generalized anxiety disorder, and cluster B traits. Cindi Armas does have a history of psychiatric mental health hospitalizations with the most recent hospitalizations occurring in 2021. Cindi Armas does have a history of past suicidal ideation and past attempts which last occurred in 2021.  There are no imminent safety concerns at this time nor plan or intent identified in regard to suicidal or homicidal ideation.  Currently there are no concerns  regarding consumption of alcohol or any recreational drugs. Cindi Armas does not consume nicotine or tobacco products.    Cindi Armas seeks to alleviate the aforementioned dysphoria and subsequent functional impairments by undergoing gender affirming surgery. Cindi Armas has already taking significant steps towards their social, medical and legal transition.  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     Cindi Armas has ample support port from their friends and family will be able to provide support emotionally and physically.  She also notes that she is prepared to take time off of work in order to recover properly and is impaired with her financial responsibility.    Cindi Armas is at the age of majority and is able to make an informed decision.  She has adequate social and instrumental support that he will interact after the procedure.    It is this writer's professional opinion that Cindi Armas's decision to have the surgery is not due to any other treatable mental disorder.  I completely support her decision and desire for the surgery and determined that it this procedure is of medical necessity to treat the underlying gender dysphoria.    I am in support of her request for surgery.  I welcome phone calls regarding nation of care with any questions to assist her receiving appropriate care.                      Sincerely,        Leticia Schuler DNP, PMHNP-BC  NPI: [id number]

## 2024-01-18 NOTE — NURSING NOTE
Is the patient currently in the state of [place]? YES    Visit mode:VIDEO    If the visit is dropped, the patient can be reconnected by: VIDEO VISIT: Text to cell phone:   Telephone Information:   Mobile [phone]       Will anyone else be joining the visit? NO  (If patient encounters technical issues they should call [phone]    How would you like to obtain your AVS? MyChart    Are changes needed to the allergy or medication list? N/A    Reason for visit: RECHECK    Evelyn CORDOVA

## 2024-02-01 ENCOUNTER — MYC MEDICAL ADVICE (OUTPATIENT)
Dept: PSYCHIATRY | Facility: CLINIC | Age: 24
End: 2024-02-01
Payer: COMMERCIAL

## 2024-02-07 NOTE — TELEPHONE ENCOUNTER
Reviewed patient's waygum message. She is requesting a letter that confirms her diagnosis of Gender Dysphoria. She only has 3 more days to submit the letter or it will be denied.     WILMAN HODGES RN on 2/7/2024 at 2:37 PM

## 2024-02-08 NOTE — PATIENT INSTRUCTIONS
"Patient Education   The Panel [facility] Program  What to Expect  Here's what to expect in the Panel Psychiatry Program.   About the program  You'll be meeting with a psychiatric doctor to check your mental health. A psychiatric doctor helps you deal with troubling thoughts and feelings by giving you medicine. They'll make sure you know the plan for your care. You may see them for a long time. When you're feeling better, they may refer you back to seeing your family doctor.   If you have any questions, we'll be glad to talk to you.  About visits  Be open  At your visits, please talk openly about your problems. It may feel hard, but it's the best way for us to help you.  Cancelling visits  If you can't come to your visit, please call us right away at [phone]. If you don't cancel at least 24 hours (1 full day) before your visit, that's \"late cancellation.\"  Not showing up for your visits  Being very late is the same as not showing up. You'll be a \"no show\" if:  You're more than 15 minutes late for a 30-minute (half hour) visit.  You're more than 30 minutes late for a 60-minute (full hour) visit.  If you cancel late or don't show up 2 times within 6 months, we may end your care.  Getting help between visits  If you need help between visits, you can call us Monday to Friday from 8 a.m. to 4:30 p.m. at [phone].  Emergency care  Call 911 or go to the nearest emergency department if your life or someone else's life is in danger.  Call 988 anytime to reach the national Suicide and Crisis hotline.  Medicine refills  To refill your medicine, call your pharmacy. You can also call [facility]'s [facility] at [phone], Monday to Friday, 8 a.m. to 4:30 p.m. It can take 1 to 3 business days to get a refill.   Forms, letters, and tests  You may have papers to fill out, like FMLA, short-term disability, and workability. We can help you with these forms at your visits, but you must have an " appointment. You may need more than 1 visit for this, to be in an intensive therapy program, or both.  Before we can give you medicine for ADHD, we may refer you to get tested for it or confirm it another way.  We may not be able to give you an emotional support animal letter.  We don't do mental health checks ordered by the court.   We don't do mental health testing, but we can refer you to get tested.   Thank you for choosing us for your care.  For informational purposes only. Not to replace the advice of your health care provider. Copyright   2022 [facility]. All rights reserved. [place] 925378 - 12/22.

## 2024-02-19 ENCOUNTER — HOSPITAL ENCOUNTER (EMERGENCY)
Facility: CLINIC | Age: 24
Discharge: HOME | End: 2024-02-19
Attending: EMERGENCY MEDICINE | Admitting: EMERGENCY MEDICINE
Payer: COMMERCIAL

## 2024-02-19 VITALS
SYSTOLIC BLOOD PRESSURE: 124 MMHG | RESPIRATION RATE: 16 BRPM | OXYGEN SATURATION: 99 % | HEART RATE: 84 BPM | DIASTOLIC BLOOD PRESSURE: 78 MMHG | TEMPERATURE: 98.5 F

## 2024-02-19 DIAGNOSIS — N82.3 RECTOVAGINAL FISTULA: ICD-10-CM

## 2024-02-19 LAB
ANION GAP SERPL CALCULATED.3IONS-SCNC: 9 MMOL/L (ref 7–15)
BASOPHILS # BLD AUTO: 0 10E3/UL (ref 0–0.2)
BASOPHILS NFR BLD AUTO: 0 %
BUN SERPL-MCNC: 6.3 MG/DL (ref 6–20)
CALCIUM SERPL-MCNC: 9 MG/DL (ref 8.6–10)
CHLORIDE SERPL-SCNC: 106 MMOL/L (ref 98–107)
CREAT SERPL-MCNC: 0.57 MG/DL (ref 0.51–0.95)
DEPRECATED HCO3 PLAS-SCNC: 24 MMOL/L (ref 22–29)
EGFRCR SERPLBLD CKD-EPI 2021: >90 ML/MIN/1.73M2
EOSINOPHIL # BLD AUTO: 0 10E3/UL (ref 0–0.7)
EOSINOPHIL NFR BLD AUTO: 0 %
ERYTHROCYTE [DISTWIDTH] IN BLOOD BY AUTOMATED COUNT: 12.1 % (ref 10–15)
GLUCOSE SERPL-MCNC: 100 MG/DL (ref 70–99)
HCT VFR BLD AUTO: 40.1 % (ref 35–47)
HGB BLD-MCNC: 13.5 G/DL (ref 11.7–15.7)
HOLD SPECIMEN: NORMAL
IMM GRANULOCYTES # BLD: 0 10E3/UL
IMM GRANULOCYTES NFR BLD: 1 %
INR PPP: 1.17 (ref 0.85–1.15)
LYMPHOCYTES # BLD AUTO: 1.1 10E3/UL (ref 0.8–5.3)
LYMPHOCYTES NFR BLD AUTO: 25 %
MCH RBC QN AUTO: 28.2 PG (ref 26.5–33)
MCHC RBC AUTO-ENTMCNC: 33.7 G/DL (ref 31.5–36.5)
MCV RBC AUTO: 84 FL (ref 78–100)
MONOCYTES # BLD AUTO: 0.3 10E3/UL (ref 0–1.3)
MONOCYTES NFR BLD AUTO: 6 %
NEUTROPHILS # BLD AUTO: 2.9 10E3/UL (ref 1.6–8.3)
NEUTROPHILS NFR BLD AUTO: 68 %
NRBC # BLD AUTO: 0 10E3/UL
NRBC BLD AUTO-RTO: 0 /100
PLATELET # BLD AUTO: 289 10E3/UL (ref 150–450)
POTASSIUM SERPL-SCNC: 3.8 MMOL/L (ref 3.4–5.3)
RBC # BLD AUTO: 4.79 10E6/UL (ref 3.8–5.2)
SODIUM SERPL-SCNC: 139 MMOL/L (ref 135–145)
WBC # BLD AUTO: 4.3 10E3/UL (ref 4–11)

## 2024-02-19 PROCEDURE — 36415 COLL VENOUS BLD VENIPUNCTURE: CPT | Performed by: EMERGENCY MEDICINE

## 2024-02-19 PROCEDURE — 99284 EMERGENCY DEPT VISIT MOD MDM: CPT | Performed by: EMERGENCY MEDICINE

## 2024-02-19 PROCEDURE — 80048 BASIC METABOLIC PNL TOTAL CA: CPT | Performed by: EMERGENCY MEDICINE

## 2024-02-19 PROCEDURE — 85610 PROTHROMBIN TIME: CPT | Performed by: EMERGENCY MEDICINE

## 2024-02-19 PROCEDURE — 99204 OFFICE O/P NEW MOD 45 MIN: CPT | Mod: GC | Performed by: UROLOGY

## 2024-02-19 PROCEDURE — 85025 COMPLETE CBC W/AUTO DIFF WBC: CPT | Performed by: EMERGENCY MEDICINE

## 2024-02-19 ASSESSMENT — ACTIVITIES OF DAILY LIVING (ADL): ADLS_ACUITY_SCORE: 33

## 2024-02-19 NOTE — CONSULTS
Urologic Surgery Consultation Note  [facility]    Cindi Armas MRN# [id number]   Age: 23 year old YOB: 2000     Date of Admission:  2/19/2024    Reason for consult: Concern for rectovaginal fistula       Requesting physician: .ATTENDPROV        Level of consult: Consult, follow and place orders           Assessment:   Cindi is a 23 year old individual with gender dysphoria with prior full depth vaginoplasty 7 years ago. She was not dilating for a few years, and noticed air from the vagina, poop from vagina and an opening between those two after dilation recently. Exam confirms an opening between vagina and rectum. She is afebrile, HDS, no leukocytosis. No urgent urologic intervention indicated. We had an extensive discussion about her options to manage this, including vaginal sparing vs non-vaginal sparing approach. We also discussed case with Dr. Williamson from colorectal surgery. We will have her follow up in clinic with Dr. Williamson and us to further discuss options. In the meantime, continue dilation with care not to further traumatize the fistula.          Recommendations:   - No urgent surgical intervention indicated.   - She has not decided on which repair approach she would like. She will benefit from detailed discussion with colorectal surgeon.  - Follow up in clinic to discuss options.          History of Present Illness:   CC: Poop per vagina     History is obtained from the patient    Cindi is a 23 year old individual with gender dysphoria with prior full depth vaginoplasty 7 years ago. She was not dilating for a few years, and noticed air from the vagina, poop from vagina and an opening between those two after dilation recently  She reports no significant post op problems with her initial surgery, other than some perineal wound healing issues  No prior history of other trauma, or similar episodes  She had a period of inconsistent dilation, and recently started  dilating again  Recently with dilation, she felt a pop. With douching, she noticed air and water from anus. With BM, she notices poop from vagina  No fever chills dysuria or s/s of infection  She follows with plastics surgery in [place], but would like to follow here for her revision surgery.          Past Medical History:   History reviewed. No pertinent past medical history.          Past Surgical History:   No past surgical history on file.          Social History:     Social History     Socioeconomic History    Marital status: Single     Spouse name: Not on file    Number of children: Not on file    Years of education: Not on file    Highest education level: Not on file   Occupational History    Not on file   Tobacco Use    Smoking status: Former     Types: Cigarettes     Quit date:      Years since quittin.1    Smokeless tobacco: Never   Vaping Use    Vaping Use: Former    Substances: Nicotine   Substance and Sexual Activity    Alcohol use: Not on file    Drug use: Not on file    Sexual activity: Not on file   Other Topics Concern    Not on file   Social History Narrative    Not on file     Social Determinants of Health     Financial Resource Strain: Not on file   Food Insecurity: Not on file   Transportation Needs: Not on file   Physical Activity: Not on file   Stress: Not on file   Social Connections: Not on file   Interpersonal Safety: Not on file   Housing Stability: Not on file             Family History:   No family history on file.          Allergies:    No Known Allergies          Medications:   No current facility-administered medications on file prior to encounter.  amphetamine-dextroamphetamine (ADDERALL) 15 MG tablet, Take 1 tablet (15 mg) by mouth 2 times daily  estradiol (ESTRACE) 1 MG tablet, Take 1 tablet (1 mg) by mouth daily  sertraline (ZOLOFT) 100 MG tablet, Take 2 tablets (200 mg) by mouth daily  traZODone (DESYREL) 100 MG tablet, Take 1 tablet (100 mg) by mouth At Bedtime               Review of Systems:      All other review of systems negative, except for what is mentioned above        Physical Exam:   /78   Pulse 84   Temp 98.5  F (36.9  C) (Oral)   Resp 16   LMP  (LMP Unknown)   SpO2 99%   GEN: AAO*3, not in acute distress, lying comfortably  HEENT: atraumatic, mucosa moist  CVS: normal heart rate, perfusing extremeties  RESP: no resp distress, satting well on RA  ABD: soft, nontender, nondistended  : s/p vaginoplasty, an area of mild pelvic floor muscle tightness felt in mid vagina,   bimanual exam (one in vagina one in anus) confirms connection between vagina and rectum roughly the size of an index finger - roughly 3 inches into the canal.  EXT: Extremities atraumatic, grossly normal range of motion  NEURO/PSYCH: CN grossly normal, no focal weakness, normal mood and thought process              Data:     Recent Labs   Lab Test 02/19/24  1228 08/01/23  1057 02/20/19  1959   WBC 4.3 5.8 7.9   HGB 13.5 13.5 14.6   CR 0.57 0.58 0.62         No results found for this or any previous visit from the past 365 days.         Seen and discussed with staff surgeon Dr. Bryant, who agrees with the assessment and plans    Houston Castillo MD   PGY-2 Urology  [facility]    Physician Attestation   I, Marco Bryant MD, saw this patient and agree with the findings and plan of care as documented in the note.      Based on clinical history and physical exam, I am fairly confident patient has a rectovaginal fistula.  This would be highly unlikely given the time course to be iatrogenically created.  However, I suppose it is possible it was smaller and she just recently dilated it which is leading to a more symptomatic fistula/bleeding.  I discussed case with Dr. Williamson today for 10 minutes. We discussed can be discharged from ED.  She would like to pursue surgical repair here at [facility].  I briefly discussed some types of repairs, some choices to make prior to surgery (keep a canal or not).    Marco Bryant,  MD  Reconstructive Urology

## 2024-02-19 NOTE — DISCHARGE INSTRUCTIONS
Follow-up with colorectal surgery.  Avoid further dilation and instrumentation of your vagina.    Return if fever, abdominal pain, vomiting, or other concerns.

## 2024-02-19 NOTE — ED PROVIDER NOTES
ED Provider Note  [facility]      History     Chief Complaint   Patient presents with    Vaginal Problem     HPI  Cindi Armas is a 23 year old male to female female who underwent gender affirming surgery in 2018.  She has a history of vaginal stenosis and dilates her vagina.  Last evening, she was dilating her vagina.  Afterwards, she induced and had douche fluid come back from her vagina as well as from her rectum.  She has subsequently had bleeding and the stool come from her vagina when she has a bowel movement.  She denies any.  ER or pelvic pain.  No fever.  No nausea or vomiting.  Patient underwent penile inversion vaginoplasty in 2018.    Past Medical History  History reviewed. No pertinent past medical history.  No past surgical history on file.  amphetamine-dextroamphetamine (ADDERALL) 15 MG tablet  estradiol (ESTRACE) 1 MG tablet  sertraline (ZOLOFT) 100 MG tablet  traZODone (DESYREL) 100 MG tablet      No Known Allergies  Family History  No family history on file.  Social History   Social History     Tobacco Use    Smoking status: Former     Types: Cigarettes     Quit date:      Years since quittin.1    Smokeless tobacco: Never   Vaping Use    Vaping Use: Former    Substances: Nicotine         A medically appropriate review of systems was performed with pertinent positives and negatives noted in the HPI, and all other systems negative.    Physical Exam   BP: 124/78  Pulse: 84  Temp: 98.5  F (36.9  C)  Resp: 18  SpO2: 98 %  Physical Exam  Vitals and nursing note reviewed.   Constitutional:       Appearance: Normal appearance.   HENT:      Head: Normocephalic and atraumatic.   Cardiovascular:      Rate and Rhythm: Normal rate and regular rhythm.      Pulses: Normal pulses.   Pulmonary:      Effort: Pulmonary effort is normal.      Breath sounds: Normal breath sounds.   Abdominal:      General: Abdomen is flat.      Tenderness: There is no abdominal tenderness.  There is no guarding.   Genitourinary:     General: Normal vulva.      Comments: Minimal blood at vaginal introitus.  Skin:     General: Skin is warm and dry.   Neurological:      General: No focal deficit present.      Mental Status: She is alert.      Motor: No weakness.      Coordination: Coordination normal.      Gait: Gait normal.           ED Course, Procedures, & Data      Procedures                      Results for orders placed or performed during the hospital encounter of 02/19/24   [facility] Draw     Status: None    Narrative    The following orders were created for panel order [facility] Draw.  Procedure                               Abnormality         Status                     ---------                               -----------         ------                     Extra Blue Top Tube[[id number]]                              Final result               Extra Red Top Tube[[id number]]                               Final result               Extra Green Top (Lithium...[[id number]]                      Final result               Extra Purple Top Tube[[id number]]                            Final result                 Please view results for these tests on the individual orders.   Extra Blue Top Tube     Status: None   Result Value Ref Range    Hold Specimen JIC    Extra Red Top Tube     Status: None   Result Value Ref Range    Hold Specimen JIC    Extra Green Top (Lithium Heparin) Tube     Status: None   Result Value Ref Range    Hold Specimen JIC    Extra Purple Top Tube     Status: None   Result Value Ref Range    Hold Specimen JIC    Basic metabolic panel     Status: Abnormal   Result Value Ref Range    Sodium 139 135 - 145 mmol/L    Potassium 3.8 3.4 - 5.3 mmol/L    Chloride 106 98 - 107 mmol/L    Carbon Dioxide (CO2) 24 22 - 29 mmol/L    Anion Gap 9 7 - 15 mmol/L    Urea Nitrogen 6.3 6.0 - 20.0 mg/dL    Creatinine 0.57 0.51 - 0.95 mg/dL    GFR Estimate >90 >60 mL/min/1.73m2    Calcium 9.0 8.6 - 10.0 mg/dL     Glucose 100 (H) 70 - 99 mg/dL   INR     Status: Abnormal   Result Value Ref Range    INR 1.17 (H) 0.85 - 1.15   CBC with platelets and differential     Status: None   Result Value Ref Range    WBC Count 4.3 4.0 - 11.0 10e3/uL    RBC Count 4.79 3.80 - 5.20 10e6/uL    Hemoglobin 13.5 11.7 - 15.7 g/dL    Hematocrit 40.1 35.0 - 47.0 %    MCV 84 78 - 100 fL    MCH 28.2 26.5 - 33.0 pg    MCHC 33.7 31.5 - 36.5 g/dL    RDW 12.1 10.0 - 15.0 %    Platelet Count 289 150 - 450 10e3/uL    % Neutrophils 68 %    % Lymphocytes 25 %    % Monocytes 6 %    % Eosinophils 0 %    % Basophils 0 %    % Immature Granulocytes 1 %    NRBCs per 100 WBC 0 <1 /100    Absolute Neutrophils 2.9 1.6 - 8.3 10e3/uL    Absolute Lymphocytes 1.1 0.8 - 5.3 10e3/uL    Absolute Monocytes 0.3 0.0 - 1.3 10e3/uL    Absolute Eosinophils 0.0 0.0 - 0.7 10e3/uL    Absolute Basophils 0.0 0.0 - 0.2 10e3/uL    Absolute Immature Granulocytes 0.0 <=0.4 10e3/uL    Absolute NRBCs 0.0 10e3/uL   CBC with platelets differential     Status: None    Narrative    The following orders were created for panel order CBC with platelets differential.  Procedure                               Abnormality         Status                     ---------                               -----------         ------                     CBC with platelets and d...[[id number]]                      Final result                 Please view results for these tests on the individual orders.     Medications - No data to display  Labs Ordered and Resulted from Time of ED Arrival to Time of ED Departure   BASIC METABOLIC PANEL - Abnormal       Result Value    Sodium 139      Potassium 3.8      Chloride 106      Carbon Dioxide (CO2) 24      Anion Gap 9      Urea Nitrogen 6.3      Creatinine 0.57      GFR Estimate >90      Calcium 9.0      Glucose 100 (*)    INR - Abnormal    INR 1.17 (*)    CBC WITH PLATELETS AND DIFFERENTIAL    WBC Count 4.3      RBC Count 4.79      Hemoglobin 13.5      Hematocrit 40.1       MCV 84      MCH 28.2      MCHC 33.7      RDW 12.1      Platelet Count 289      % Neutrophils 68      % Lymphocytes 25      % Monocytes 6      % Eosinophils 0      % Basophils 0      % Immature Granulocytes 1      NRBCs per 100 WBC 0      Absolute Neutrophils 2.9      Absolute Lymphocytes 1.1      Absolute Monocytes 0.3      Absolute Eosinophils 0.0      Absolute Basophils 0.0      Absolute Immature Granulocytes 0.0      Absolute NRBCs 0.0       No orders to display     Plastic surgery notes from [facility] reviewed.     Seen by Dr. Bryant, reconstructive urology.  Please see note for details.    Critical care was not performed.     Medical Decision Making  The patient's presentation was of moderate complexity (an undiagnosed new problem with uncertain diagnosis).    The patient's evaluation involved:  review of external note(s) from 1 sources (see separate area of note for details)  ordering and/or review of 3+ test(s) in this encounter (see separate area of note for details)  discussion of management or test interpretation with another health professional (Dr. Bryant- Reconstructive Urology)    The patient's management necessitated only low risk treatment.       Assessment & Plan    23 year old male to female transgender patient status post gender affirming surgery to the emergency department with report of stool and blood from her vagina as well as due to fluid per rectum after dilating her vagina last evening.  The patient appears clinically well and has no abdominal tenderness on exam.  Reconstructive urology consulted and Dr. Vega or saw the patient in the emergency department.  He was able to identify a rectovaginal fistula on physical examination.  He discussed this finding with colorectal surgery.  Plan for outpatient follow-up.  Referrals placed.  Patient appears clinically well and appropriate for outpatient management.  Return precautions provided.    I have reviewed the nursing notes. I  have reviewed the findings, diagnosis, plan and need for follow up with the patient.    Discharge Medication List as of 2/19/2024  3:52 PM          Final diagnoses:   Rectovaginal fistula     Chart documentation was completed with Dragon voice-recognition software. Even though reviewed, this chart may still contain some grammatical, spelling, and word errors.     Jomar Mejias Md    [facility] EMERGENCY DEPARTMENT  2/19/2024     Jomar Mejias MD  02/20/24 5260

## 2024-02-19 NOTE — ED TRIAGE NOTES
Tear in perineum   Trans female, was dilating last night and tore something  + bleeding, no pain  Gender affirming surgery 2018     Triage Assessment (Adult)       Row Name 02/19/24 1224          Triage Assessment    Airway WDL WDL        Respiratory WDL    Respiratory WDL WDL        Skin Circulation/Temperature WDL    Skin Circulation/Temperature WDL WDL        Cardiac WDL    Cardiac WDL WDL        Peripheral/Neurovascular WDL    Peripheral Neurovascular WDL WDL        Cognitive/Neuro/Behavioral WDL    Cognitive/Neuro/Behavioral WDL WDL

## 2024-02-21 DIAGNOSIS — N82.3 RECTOVAGINAL FISTULA: Primary | ICD-10-CM

## 2024-02-22 ENCOUNTER — TELEPHONE (OUTPATIENT)
Dept: SURGERY | Facility: CLINIC | Age: 24
End: 2024-02-22
Payer: COMMERCIAL

## 2024-03-01 ENCOUNTER — VIRTUAL VISIT (OUTPATIENT)
Dept: PSYCHIATRY | Facility: CLINIC | Age: 24
End: 2024-03-01
Payer: COMMERCIAL

## 2024-03-01 DIAGNOSIS — F90.2 ADHD (ATTENTION DEFICIT HYPERACTIVITY DISORDER), COMBINED TYPE: Primary | ICD-10-CM

## 2024-03-01 DIAGNOSIS — F60.9 CLUSTER B PERSONALITY DISORDER IN ADULT (H): ICD-10-CM

## 2024-03-01 DIAGNOSIS — F64.9 GENDER DYSPHORIA: ICD-10-CM

## 2024-03-01 DIAGNOSIS — F33.41 RECURRENT MAJOR DEPRESSIVE DISORDER, IN PARTIAL REMISSION (H): ICD-10-CM

## 2024-03-01 DIAGNOSIS — F41.1 GENERALIZED ANXIETY DISORDER: ICD-10-CM

## 2024-03-01 DIAGNOSIS — F43.10 PTSD (POST-TRAUMATIC STRESS DISORDER): ICD-10-CM

## 2024-03-01 DIAGNOSIS — F33.0 MDD (MAJOR DEPRESSIVE DISORDER), RECURRENT EPISODE, MILD (H): ICD-10-CM

## 2024-03-01 PROCEDURE — 99443 PR PHYSICIAN TELEPHONE EVALUATION 21-30 MIN: CPT | Mod: 93 | Performed by: NURSE PRACTITIONER

## 2024-03-01 RX ORDER — SERTRALINE HYDROCHLORIDE 100 MG/1
200 TABLET, FILM COATED ORAL DAILY
Qty: 180 TABLET | Refills: 1 | Status: SHIPPED | OUTPATIENT
Start: 2024-03-01 | End: 2024-07-01

## 2024-03-01 ASSESSMENT — PAIN SCALES - GENERAL: PAINLEVEL: MODERATE PAIN (4)

## 2024-03-01 NOTE — PROGRESS NOTES
Phone Visit  Length of call. 30 minutes    *Difficulty with Internet connection, changed to phone    OUTPATIENT PSYCHIATRIC FOLLOW-UP      3/1/2024     Provider: EUSEBIA Pichardo CNP      Appointment Start Time: 900  Appointment End Time: 930    Name: Cindi Armas   : 2000                    Preferred Name: Cindi      Screening Tools           2024     2:15 PM 2023    10:54 AM 2023     7:43 AM   PHQ   PHQ-9 Total Score 15 1 2   Q9: Thoughts of better off dead/self-harm past 2 weeks Not at all Not at all Not at all         2024     2:16 PM 2023     9:52 AM   ARIELLE-7 SCORE   Total Score 16 (severe anxiety) 18 (severe anxiety)   Total Score 16 18     The following assessments were completed by patient for this visit:  PROMIS 10-Global Health (all questions and answers displayed):       2023     7:57 AM 2023    10:55 AM 2024     2:18 PM   PROMIS 10   In general, would you say your health is: Good Good Fair   In general, would you say your quality of life is: Very good Excellent Fair   In general, how would you rate your physical health? Very good Excellent Fair   In general, how would you rate your mental health, including your mood and your ability to think? Fair Excellent Fair   In general, how would you rate your satisfaction with your social activities and relationships? Very good Excellent Good   In general, please rate how well you carry out your usual social activities and roles Good Excellent Fair   To what extent are you able to carry out your everyday physical activities such as walking, climbing stairs, carrying groceries, or moving a chair? Moderately Completely Moderately   In the past 7 days, how often have you been bothered by emotional problems such as feeling anxious, depressed, or irritable? Sometimes Never Sometimes   In the past 7 days, how would you rate your fatigue on average? Mild None Moderate   In the past 7 days, how would you rate  "your pain on average, where 0 means no pain, and 10 means worst imaginable pain? 0 0 0   In general, would you say your health is: 3 3 2   In general, would you say your quality of life is: 4 5 2   In general, how would you rate your physical health? 4 5 2   In general, how would you rate your mental health, including your mood and your ability to think? 2 5 2   In general, how would you rate your satisfaction with your social activities and relationships? 4 5 3   In general, please rate how well you carry out your usual social activities and roles. (This includes activities at home, at work and in your community, and responsibilities as a parent, child, spouse, employee, friend, etc.) 3 5 2   To what extent are you able to carry out your everyday physical activities such as walking, climbing stairs, carrying groceries, or moving a chair? 3 5 3   In the past 7 days, how often have you been bothered by emotional problems such as feeling anxious, depressed, or irritable? 3 1 3   In the past 7 days, how would you rate your fatigue on average? 2 1 3   In the past 7 days, how would you rate your pain on average, where 0 means no pain, and 10 means worst imaginable pain? 0 0 0   Global Mental Health Score 13 20 10   Global Physical Health Score 16 20 13   PROMIS TOTAL - SUBSCORES 29 40 23          History of Present Illness      Patient attended the session alone.     Interim History:  I last saw Cindi Armas for outpatient psychiatry follow-up on 09/11/23. During that appointment, we established care and continued plan.  .     Current stressors include: Physical sx.     Coping mechanisms and supports include: Hobbies    Side effects: Denies    Medication adherence: Reports good med adherence.    Psychiatric Review of Systems      Cindi Armas reports mood has been: \"Okay\".     - Has a rectovaginal fistula ; surgery needs to be done completed sooner. Has appt 03/08/24 in [place] w/ colo rectal surgery and " "gender specialist.   - Has reduced work hours to ensure she can stay hygenic ; overall okay. In process for FMLA for surgery.   - Still trying to stay positive, staying active.      Depression has been:   - Mood was rough for 2 days after fistula.   - Mood lately has been \"somewhat good\"  - Good energy.   - \"I'm okay, I think\". Reflects there is so much coming up. Preparing self for surgery. Has reflected if surgery is not successful.   - Thinking about top surgery.   - Meal prepping improved.       Anxiety has been:  - Less social she has noticed. Not isolating completely.   - Less socially engaged less month.   - Does have a social engagement scheduled coming up.     Sleep has been: Been okay. Using an eye mask during the last flight out/first flight out trip. Concerned about possible RLS. Feet feel warm, possible itchy. Not in legs. Can sustain sleep.     Allison sxs: Denies    Psychosis sxs: Denies    ADHD sxs: Some procrastination.     PTSD sxs: Denies    SI/SIB: Denies SI, SIB, HI. Sense of hopelessness initially during week of a fistula       Medications Prior to Appointment       Current Outpatient Medications   Medication Sig Dispense Refill    amphetamine-dextroamphetamine (ADDERALL) 15 MG tablet Take 1 tablet (15 mg) by mouth 2 times daily 60 tablet 0    estradiol (ESTRACE) 1 MG tablet Take 1 tablet (1 mg) by mouth daily 90 tablet 1    sertraline (ZOLOFT) 100 MG tablet Take 2 tablets (200 mg) by mouth daily 180 tablet 1    traZODone (DESYREL) 100 MG tablet Take 1 tablet (100 mg) by mouth At Bedtime 90 tablet 1        Previous medication trials include but not limited to:  - Abilify   - Adderall XR  - Concerta  - Strattera, ineffective  - minipress  - trazodone  - sertraline  - bupropion, increased anxiety                    Medication Compliance: No                 Pharmacogenomic Testing Completed: No      Medical History      History of head injuries: No  History of seizures: No  History of cardiac " events: No  History of Tardive Dyskinesia: No         No past medical history on file.     Surgery: No past surgical history on file.  Primary Care Provider: Physician No Ref-Primary        Social History      Current Living situation:  [place], [place] with Roommate.    Current use of drugs or alcohol: Denies     Tobacco use: No    Employment: Yes:      Relationship Status: single     Vitals      Not obtained d/t virtual visit.     There were no vitals taken for this visit.    Labs        Most recent laboratory results reviewed and pertinent results include:   Lab on 09/13/2023   Component Date Value Ref Range Status    Cannabinoids (77-dmv-3-carboxy-9-T* 09/13/2023 Not Detected  Not Detected, Indeterminate Final    Cutoff for a negative cannabinoid is 50 ng/mL or less.    Phencyclidine 09/13/2023 Not Detected  Not Detected, Indeterminate Final    Cutoff for a negative PCP is 25 ng/mL or less.    Cocaine (Benzoylecgonine) 09/13/2023 Not Detected  Not Detected, Indeterminate Final    Cutoff for a negative cocaine is 150 ng/ml or less.    Methamphetamine (d-Methamphetamine) 09/13/2023 Not Detected  Not Detected, Indeterminate Final    Cutoff for a negative methamphetamine is 500 ng/ml or less.    Opiates (Morphine) 09/13/2023 Not Detected  Not Detected, Indeterminate Final    Cutoff for a negative opiate is 100 ng/ml or less.    Amphetamine (d-Amphetamine) 09/13/2023 Detected (A)  Not Detected, Indeterminate Final    Cutoff for a positive amphetamine is greater than 500 ng/ml.  This is an unconfirmed screening result to be used for medical purposes only.     Benzodiazepines (Nordiazepam) 09/13/2023 Not Detected  Not Detected, Indeterminate Final    Cutoff for a negative benzodiazepine is 150 ng/ml or less.    Tricyclic Antidepressants (Desipra* 09/13/2023 Not Detected  Not Detected, Indeterminate Final    Cutoff for a negative tricyclic antidepressant is 300 ng/ml or less.    Methadone 09/13/2023 Not  Detected  Not Detected, Indeterminate Final    Cutoff for a negative methadone is 200 ng/ml or less.    Barbiturates (Butalbital) 09/13/2023 Not Detected  Not Detected, Indeterminate Final    Cutoff for a negative barbituate is 200 ng/ml or less.    Oxycodone 09/13/2023 Not Detected  Not Detected, Indeterminate Final    Cutoff for a negative oxycodone is 100 ng/mL or less.    Buprenorphine 09/13/2023 Not Detected  Not Detected, Indeterminate Final    Cutoff for a negative buprenorphine is 10 ng/ml or less.   Office Visit on 08/01/2023   Component Date Value Ref Range Status    HIV Antigen Antibody Combo 08/01/2023 Nonreactive  Nonreactive Final    HIV-1 p24 Ag & HIV-1/HIV-2 Ab Not Detected    Hepatitis C Antibody 08/01/2023 Nonreactive  Nonreactive Final    Treponema Antibody Total 08/01/2023 Nonreactive  Nonreactive Final    Sodium 08/01/2023 139  136 - 145 mmol/L Final    Potassium 08/01/2023 4.1  3.4 - 5.3 mmol/L Final    Chloride 08/01/2023 103  98 - 107 mmol/L Final    Carbon Dioxide (CO2) 08/01/2023 26  22 - 29 mmol/L Final    Anion Gap 08/01/2023 10  7 - 15 mmol/L Final    Urea Nitrogen 08/01/2023 12.6  6.0 - 20.0 mg/dL Final    Creatinine 08/01/2023 0.58  0.51 - 0.95 mg/dL Final    Calcium 08/01/2023 9.0  8.6 - 10.0 mg/dL Final    Glucose 08/01/2023 93  70 - 99 mg/dL Final    Alkaline Phosphatase 08/01/2023 78  35 - 104 U/L Final    AST 08/01/2023 19  0 - 45 U/L Final    Reference intervals for this test were updated on 6/12/2023 to more accurately reflect our healthy population. There may be differences in the flagging of prior results with similar values performed with this method. Interpretation of those prior results can be made in the context of the updated reference intervals.    ALT 08/01/2023 11  0 - 50 U/L Final    Reference intervals for this test were updated on 6/12/2023 to more accurately reflect our healthy population. There may be differences in the flagging of prior results with similar  values performed with this method. Interpretation of those prior results can be made in the context of the updated reference intervals.      Protein Total 08/01/2023 7.1  6.4 - 8.3 g/dL Final    Albumin 08/01/2023 4.4  3.5 - 5.2 g/dL Final    Bilirubin Total 08/01/2023 0.5  <=1.2 mg/dL Final    GFR Estimate 08/01/2023 >90  >60 mL/min/1.73m2 Final    Estradiol 08/01/2023 <5  pg/mL Final    Healthy Men:   11.3-43.2 pg/mL    Healthy Postmenopausal Women:  Postmenopause: <5-138 pg/mL    Healthy Pregnant Women:  1st trimester: 154-3243 pg/mL  2nd trimester: 1561-84728 pg/mL  3rd trimester: 8525->11855 pg/mL    Healthy Women Cycle Phase:  Follicular: 30.9-90.4 pg/mL  Ovulation: 60.4-533 pg/mL  Luteal: 60.4-232 pg/mL    Healthy Women Cycle Sub-Phase:  Early Follicular: 20.5-62.8 pg/mL  Intermediate Follicular: 26-79.8 pg/mL  Late Follicular: 49.5-233 pg/mL  Ovulation: 60.4-602 pg/mL  Early Luteal: 51.1-179 pg/mL  Intermediate Luteal: 66.5-305 pg/mL  Late Luteal: 30.2-222 pg/mL    WBC Count 08/01/2023 5.8  4.0 - 11.0 10e3/uL Final    RBC Count 08/01/2023 4.70  3.80 - 5.20 10e6/uL Final    Hemoglobin 08/01/2023 13.5  11.7 - 15.7 g/dL Final    Hematocrit 08/01/2023 39.7  35.0 - 47.0 % Final    MCV 08/01/2023 85  78 - 100 fL Final    MCH 08/01/2023 28.7  26.5 - 33.0 pg Final    MCHC 08/01/2023 34.0  31.5 - 36.5 g/dL Final    RDW 08/01/2023 13.2  10.0 - 15.0 % Final    Platelet Count 08/01/2023 225  150 - 450 10e3/uL Final    Cholesterol 08/01/2023 169  <200 mg/dL Final    Triglycerides 08/01/2023 89  <150 mg/dL Final    Direct Measure HDL 08/01/2023 62  >=50 mg/dL Final    LDL Cholesterol Calculated 08/01/2023 89  <=100 mg/dL Final    Non HDL Cholesterol 08/01/2023 107  <130 mg/dL Final    Cannabinoids (15-itq-7-carboxy-9-T* 08/01/2023 Not Detected  Not Detected, Indeterminate Final    Cutoff for a negative cannabinoid is 50 ng/mL or less.    Phencyclidine 08/01/2023 Not Detected  Not Detected, Indeterminate Final    Cutoff  for a negative PCP is 25 ng/mL or less.    Cocaine (Benzoylecgonine) 08/01/2023 Not Detected  Not Detected, Indeterminate Final    Cutoff for a negative cocaine is 150 ng/ml or less.    Methamphetamine (d-Methamphetamine) 08/01/2023 Not Detected  Not Detected, Indeterminate Final    Cutoff for a negative methamphetamine is 500 ng/ml or less.    Opiates (Morphine) 08/01/2023 Detected (A)  Not Detected, Indeterminate Final    Cutoff for a positive opiate is greater than 100 ng/ml.  This is an unconfirmed screening result to be used for medical purposes only.     Amphetamine (d-Amphetamine) 08/01/2023 Not Detected  Not Detected, Indeterminate Final    Cutoff for a negative amphetamine is 500 ng/mL or less.    Benzodiazepines (Nordiazepam) 08/01/2023 Not Detected  Not Detected, Indeterminate Final    Cutoff for a negative benzodiazepine is 150 ng/ml or less.    Tricyclic Antidepressants (Desipra* 08/01/2023 Not Detected  Not Detected, Indeterminate Final    Cutoff for a negative tricyclic antidepressant is 300 ng/ml or less.    Methadone 08/01/2023 Not Detected  Not Detected, Indeterminate Final    Cutoff for a negative methadone is 200 ng/ml or less.    Barbiturates (Butalbital) 08/01/2023 Not Detected  Not Detected, Indeterminate Final    Cutoff for a negative barbituate is 200 ng/ml or less.    Oxycodone 08/01/2023 Not Detected  Not Detected, Indeterminate Final    Cutoff for a negative oxycodone is 100 ng/mL or less.    Propoxyphene (Norpropoxyphene) 08/01/2023 Not Detected  Not Detected, Indeterminate Final    Cutoff for a negative propoxyphene is 300 ng/ml or less.    Buprenorphine 08/01/2023 Not Detected  Not Detected, Indeterminate Final    Cutoff for a negative buprenorphine is 10 ng/ml or less.    Sex Hormone Binding Globulin 08/01/2023 24 (L)  30 - 135 nmol/L Final    Free Testosterone Calculated 08/01/2023 0.15  ng/dL Final    Adult Female Reference Range:  18-30 Years: 0.08-0.74 ng/dL  31-40 Years:  "0.13-0.92 ng/dL  41-51 Years: 0.11-0.58 ng/dL  Postmenopausal: 0.06-0.38 ng/dL    Testosterone Total 08/01/2023 7 (L)  8 - 60 ng/dL Final    Chlamydia Trachomatis 08/01/2023 Negative  Negative Final    Negative for C. trachomatis rRNA by transcription mediated amplification.   A negative result by transcription mediated amplification does not preclude the presence of infection because results are dependent on proper and adequate collection, absence of inhibitors and sufficient rRNA to be detected.    Neisseria gonorrhoeae 08/01/2023 Negative  Negative Final    Negative for N. gonorrhoeae rRNA by transcription mediated amplification. A negative result by transcription mediated amplification does not preclude the presence of C. trachomatis infection because results are dependent on proper and adequate collection, absence of inhibitors and sufficient rRNA to be detected.     EKG: Most recent EKG from 2019 reviewed. QTc interval 423.          Medical Review of Systems      Pertinent positives noted in HPI and below:   Review of Systems   All other systems reviewed and are negative.       No LMP recorded. (Menstrual status: Reassignment).         Mental Status Exam      Appearance: awake, alert, adequately groomed, appeared stated age and no apparent distress  Attitude:  cooperative   Eye Contact:  good  Gait and Station: normal, no gross abnormalities noted by observation  Psychomotor Behavior:  no evidence of tardive dyskinesia, dystonia, or tics  Oriented to:  person, place, time, and situation  Attention Span and Concentration:  normal  Speech:  clear, coherent, regular rate, rhythm, and volume  Language: intact  Mood: \"Pretty good\"  Affect:  appropriate and in normal range  Associations:  no loose associations  Thought Process:  logical, linear and goal oriented  Thought Content:  no evidence of suicidal ideation or homicidal ideation, no evidence of psychotic thought, no auditory hallucinations present and no " visual hallucinations present  Recent and Remote Memory:  Intact to interview. Not formally assessed. No amnesia.  Fund of Knowledge: appropriate  Insight: Partial to full  Judgment:  intact, adequate for safety  Impulse Control:  intact         Risk Assessment       Updated:3/1/2024     Suicide assessment  Acute: Low  Chronic:Low  Imminent: Low     Risk factors  History of suicide attempts: Yes  History of self-injurious behavior: Yes  Terry. Axis I psychiatric diagnoses: Yes  Substance use disorder: No  Symptoms:  impulsivity  Family history of completed suicide or attempted suicide: Yes  Accessibility to firearms: No  Interpersonal factors: Financial stress, social stress, LGBQT+     Protective factors  Ability to cope with the stress: Yes  Family responsibility and supportive:Yes  Positive therapeutic relationships: No  Social support:Yes  Jew beliefs:  No  Connectedness with mental health providers: Yes, establishing today     Homicidal Risk  Acute: Low  Chronic: Low  Imminent: Low       Assessment     Cindi Armas reports doing relatively okay.  We did discuss medical needs that have arose.  Did impact mood initially but is trying to stay positive.  Is overall reflective of the process in regards to surgery.  No imminent safety concerns identified today.  Will stop trazodone as it provides too much of a hangover effect in which she is not using them.  We did figure out the Adderall concerns that were occurring last time.  Prescription was sent by Dr. Faust in December however he prescribed Adderall IR versus Adderall XR.  She has been only taking 1 tablet of IR 15 mg and was feeling it was wearing off faster than previously.  Will mail prescription for Adderall XR 15 mg as [facility] is not currently excepting electronic prescriptions.  Patient is aware of this.  Will only mail 1 prescription for security purposes and patient will request the remaining 2 prescriptions and hopefully able to prescribe  electronically.  Patient will prompt provider after meeting with [facility] providers regarding rectovaginal fistula.  Will most likely need revised letter as the surgery type has changed.      Pharmacologic:    -Continue Adderall XR 15 mg by mouth every morning.       -Continue sertraline 100 mg tablet, take 2 tablets by mouth every day     -Stop Trazodone.         Psychosocial: Would benefit from individual therapy with focus of Dialectical Behavior Therapy (DBT). DBT would be helpful in addressing emotional regulation, distress tolerance, mindfulness, and interpersonal effectiveness.    -Patient is interested in individual therapy.  We discussed this further today.  She is going to contact [facility] to see if there is options they know of providers that are licensed in multiple states that allow more flexibility with scheduling and consistent therapy while working.  We also discussed looking into better help and talk space as potential options.           Diagnosis       ADHD, combined presentation  ARIELLE with panic attacks  MDD, recurrent in partial to full remission  Cluster B traits  Gender dysphoria     PTSD by history  Borderline personality disorder by history    Rule out eating d/o        Plan         1) Medications:    -Continue Adderall XR 15 mg by mouth every morning.  *Pt to call with , no rx sent today. Will send 3 Rxs when received     -Continue sertraline 100 mg tablet, take 2 tablets by mouth every day                    [place]NPMP: I have queried the [place] and/or [place] Prescription Monitoring Program for this patient for the preceding 12 months, or reviewed the report provided by my proxy delegate. I have not identified any concerns.  2) Risk vs benefits of medications reviewed: Yes  3) Life style modifications: sleep hygiene, exercise, healthy diet  4) Medical concerns:    -Urine drug screen to be completed, no refill provided until this is done  5) Other:   Recommend individual therapy, looking into  options  Consider eating d/o assessment   6) Refrain from drinking alcohol and/or use of drugs.   7) Please secure all prescription and OTC medications, sharps, and caustic substances. Please remove all firearms and ammunition.  8) Review outside records, get GÓMEZ's, coordinate with outside providers  9) In case of emergency call 911 or go to the nearest ER, this includes patient voicing thought of harming self or others as well as additional safety concerns   10) Follow-up: 8-12weeks, or sooner if needed.      Administrative Billing:   Supportive therapy was provided, focusing on reflective listening and solution focused problem solving.    Total time preparing to see this patient, face-to-face time, documenting in the EHR, and coordinating care time on the same calendar date: 35 minutes         Signed:   EUSEBIA Pichardo CNP on 3/1/2024 at 9:35 AM     Disclaimer: This note consists of symbols derived from keyboarding, dictation and/or voice recognition software. As a result, there may be errors in the script that have gone undetected. Please consider this when interpreting information found in this chart.

## 2024-03-06 ENCOUNTER — TELEPHONE (OUTPATIENT)
Dept: PLASTIC SURGERY | Facility: CLINIC | Age: 24
End: 2024-03-06
Payer: COMMERCIAL

## 2024-03-06 NOTE — TELEPHONE ENCOUNTER
[facility] Call Center    Phone Message    May a detailed message be left on voicemail: yes     Reason for Call: Appointment Intake    Referring Provider Name: NA  Diagnosis and/or Symptoms:   Breast Augmentation/Implants (Gender Affirmation/Confirmation Surgery)    Pt requesting Dr. Nelson (open to other providers, just not [facility]). Prefers [facility] but will go to [facility] if sooner.     Preferred pronouns she/her.     Action Taken: Message routed to:  [facility] ([facility] [[id number]]    Travel Screening: Not Applicable

## 2024-03-07 ENCOUNTER — TELEPHONE (OUTPATIENT)
Dept: PSYCHIATRY | Facility: CLINIC | Age: 24
End: 2024-03-07
Payer: COMMERCIAL

## 2024-03-07 NOTE — TELEPHONE ENCOUNTER
1) RN reviewed failed transmission message for sertraline (ZOLOFT) 100 MG tablet (Take 2 tablets (200 mg) by mouth daily) prescription sent on 3/1/24.     2) RN spoke with pharmacy staff who state that they did receive the prescription and that pt last purchased this medication on 2/20/24 for a quantity of 180.      3) No further action required at this time.

## 2024-03-08 ENCOUNTER — MYC MEDICAL ADVICE (OUTPATIENT)
Dept: PLASTIC SURGERY | Facility: CLINIC | Age: 24
End: 2024-03-08
Payer: COMMERCIAL

## 2024-03-08 DIAGNOSIS — F64.0 GENDER DYSPHORIA IN ADULT: Primary | ICD-10-CM

## 2024-03-08 RX ORDER — DEXTROAMPHETAMINE SACCHARATE, AMPHETAMINE ASPARTATE MONOHYDRATE, DEXTROAMPHETAMINE SULFATE AND AMPHETAMINE SULFATE 3.75; 3.75; 3.75; 3.75 MG/1; MG/1; MG/1; MG/1
15 CAPSULE, EXTENDED RELEASE ORAL DAILY
Qty: 30 CAPSULE | Refills: 0 | Status: SHIPPED | OUTPATIENT
Start: 2024-03-08 | End: 2024-04-07

## 2024-03-08 RX ORDER — DEXTROAMPHETAMINE SACCHARATE, AMPHETAMINE ASPARTATE MONOHYDRATE, DEXTROAMPHETAMINE SULFATE AND AMPHETAMINE SULFATE 3.75; 3.75; 3.75; 3.75 MG/1; MG/1; MG/1; MG/1
15 CAPSULE, EXTENDED RELEASE ORAL DAILY
Qty: 30 CAPSULE | Refills: 0 | Status: SHIPPED | OUTPATIENT
Start: 2024-04-08 | End: 2024-05-08

## 2024-03-08 RX ORDER — DEXTROAMPHETAMINE SACCHARATE, AMPHETAMINE ASPARTATE MONOHYDRATE, DEXTROAMPHETAMINE SULFATE AND AMPHETAMINE SULFATE 3.75; 3.75; 3.75; 3.75 MG/1; MG/1; MG/1; MG/1
15 CAPSULE, EXTENDED RELEASE ORAL DAILY
Qty: 30 CAPSULE | Refills: 0 | Status: SHIPPED | OUTPATIENT
Start: 2024-05-09 | End: 2024-06-08

## 2024-03-08 NOTE — CONFIDENTIAL NOTE
Writer called to follow up on pt request for breast augmentation consult with Dr. Nelson. Pt couldn't talk but stated she'll call back in an hour to schedule.

## 2024-03-08 NOTE — TELEPHONE ENCOUNTER
[facility] :  Care Coordination Note     SITUATION   Cindi Armas (she/her) is a 23 year old female who is receiving support for:  No chief complaint on file.  .    BACKGROUND     Pt is scheduled for a gender affirming breast augmentation with Dr. Hutchinson on 7/31/24.     ASSESSMENT     Surgery              CGC Assessment  [facility] ([facility]) Enrollment: (P) Enrolled  Patient has a therapist: (P) Yes  Letter of support #1: (P) Requested  Surgery being considered: (P) Yes  Augmentation: (P) Yes  Hormones at least 12mo: (P) Yes    Pt reports:   No nicotine   HRT 12 years  PLAN          Nursing Interventions:  @FLOW([id number]::1:2)@    Follow-up plan:  1. Obtain LOS - writer encouraged her to have it by the consult.        Birgit Ko

## 2024-04-23 DIAGNOSIS — Z78.9 MALE-TO-FEMALE TRANSGENDER PERSON: ICD-10-CM

## 2024-04-23 RX ORDER — ESTRADIOL 1 MG/1
1 TABLET ORAL DAILY
Qty: 90 TABLET | Refills: 1 | Status: SHIPPED | OUTPATIENT
Start: 2024-04-23

## 2024-05-03 NOTE — TELEPHONE ENCOUNTER
FUTURE VISIT INFORMATION      FUTURE VISIT INFORMATION:  Date: 7/31/24  Time: 1:30pm  [place] [facility] [place]FORMATION:  Reason for visit/diagnosis   gender affirming breast augmentation with Dr. Hutchinson on 7/31/24.        RECORDS REQUESTED FROM:       No recs to collect

## 2024-06-06 ENCOUNTER — VIRTUAL VISIT (OUTPATIENT)
Dept: PSYCHOLOGY | Facility: CLINIC | Age: 24
End: 2024-06-06
Payer: COMMERCIAL

## 2024-06-06 DIAGNOSIS — F43.10 PTSD (POST-TRAUMATIC STRESS DISORDER): Primary | ICD-10-CM

## 2024-06-06 PROCEDURE — 90791 PSYCH DIAGNOSTIC EVALUATION: CPT | Mod: 95 | Performed by: COUNSELOR

## 2024-06-11 ENCOUNTER — APPOINTMENT (OUTPATIENT)
Dept: URBAN - METROPOLITAN AREA CLINIC 258 | Age: 24
Setting detail: DERMATOLOGY
End: 2024-06-12

## 2024-06-11 DIAGNOSIS — L81.0 POSTINFLAMMATORY HYPERPIGMENTATION: ICD-10-CM

## 2024-06-11 DIAGNOSIS — L70.0 ACNE VULGARIS: ICD-10-CM

## 2024-06-11 PROCEDURE — OTHER ADDITIONAL NOTES: OTHER

## 2024-06-11 PROCEDURE — OTHER PRESCRIPTION MEDICATION MANAGEMENT: OTHER

## 2024-06-11 PROCEDURE — OTHER COUNSELING: OTHER

## 2024-06-11 PROCEDURE — OTHER PRESCRIPTION: OTHER

## 2024-06-11 PROCEDURE — OTHER MIPS QUALITY: OTHER

## 2024-06-11 PROCEDURE — 99203 OFFICE O/P NEW LOW 30 MIN: CPT

## 2024-06-11 RX ORDER — DOXYCYCLINE HYCLATE 100 MG/1
CAPSULE, GELATIN COATED ORAL BID
Qty: 30 | Refills: 1 | Status: ERX | COMMUNITY
Start: 2024-06-11

## 2024-06-11 RX ORDER — TRETIONIN 0.25 MG/G
CREAM TOPICAL QHS
Qty: 45 | Refills: 3 | Status: ERX | COMMUNITY
Start: 2024-06-11

## 2024-06-11 ASSESSMENT — LOCATION ZONE DERM
LOCATION ZONE: SCALP
LOCATION ZONE: LEG
LOCATION ZONE: VULVA
LOCATION ZONE: FACE

## 2024-06-11 ASSESSMENT — LOCATION SIMPLE DESCRIPTION DERM
LOCATION SIMPLE: HAIR
LOCATION SIMPLE: RIGHT PRETIBIAL REGION
LOCATION SIMPLE: GROIN
LOCATION SIMPLE: LEFT PRETIBIAL REGION
LOCATION SIMPLE: LEFT CHEEK
LOCATION SIMPLE: RIGHT CHEEK

## 2024-06-11 ASSESSMENT — LOCATION DETAILED DESCRIPTION DERM
LOCATION DETAILED: RIGHT INFERIOR MEDIAL MALAR CHEEK
LOCATION DETAILED: RIGHT PROXIMAL PRETIBIAL REGION
LOCATION DETAILED: LEFT PROXIMAL PRETIBIAL REGION
LOCATION DETAILED: MONS PUBIS
LOCATION DETAILED: HAIR
LOCATION DETAILED: LEFT INFERIOR CENTRAL MALAR CHEEK

## 2024-06-11 NOTE — PROCEDURE: PRESCRIPTION MEDICATION MANAGEMENT
Detail Level: Zone
Plan: Vanicream cleanser morning and night\\nUse avene xeracalm balm with Tretinoin
Render In Strict Bullet Format?: No
Initiate Treatment: doxycycline hyclate 100 mg capsule\\n-Take 1 pill by mouth once per day with food for acne\\n\\ntretinoin 0.025 % topical cream QHS\\n-Apply a pea sized amount in a thin layer to the face 2-3 night per week, if well tolerated, increased to once nightly as tolerated for acne. Follow with a moisturizer.

## 2024-06-11 NOTE — HPI: ACNE (PATIENT REPORTED)
Where Is Your Acne Located?: Face and buttocks.
Additional Comments (Use Complete Sentences): [place] Posay skin products. Only OTC products.

## 2024-06-11 NOTE — PROCEDURE: ADDITIONAL NOTES
Render Risk Assessment In Note?: no
Detail Level: Simple
Additional Notes: Patient notes that she is unhappy with dark pigment from scarring.\\nObserved scars on legs and surgical scarring in the groin.\\nDiscussed option of prescription lightening cream at a later date.\\nScars look otherwise normal.

## 2024-06-11 NOTE — PROCEDURE: COUNSELING
Topical Clindamycin Counseling: Patient counseled that this medication may cause skin irritation or allergic reactions.  In the event of skin irritation, the patient was advised to reduce the amount of the drug applied or use it less frequently.   The patient verbalized understanding of the proper use and possible adverse effects of clindamycin.  All of the patient's questions and concerns were addressed.
Spironolactone Counseling: Patient advised regarding risks of diarrhea, abdominal pain, hyperkalemia, birth defects (for female patients), liver toxicity and renal toxicity. The patient may need blood work to monitor liver and kidney function and potassium levels while on therapy. The patient verbalized understanding of the proper use and possible adverse effects of spironolactone.  All of the patient's questions and concerns were addressed.
Azelaic Acid Pregnancy And Lactation Text: This medication is considered safe during pregnancy and breast feeding.
Isotretinoin Pregnancy And Lactation Text: This medication is Pregnancy Category X and is considered extremely dangerous during pregnancy. It is unknown if it is excreted in breast milk.
Include Pregnancy/Lactation Warning?: No
Azithromycin Counseling:  I discussed with the patient the risks of azithromycin including but not limited to GI upset, allergic reaction, drug rash, diarrhea, and yeast infections.
Dapsone Pregnancy And Lactation Text: This medication is Pregnancy Category C and is not considered safe during pregnancy or breast feeding.
Topical Sulfur Applications Counseling: Topical Sulfur Counseling: Patient counseled that this medication may cause skin irritation or allergic reactions.  In the event of skin irritation, the patient was advised to reduce the amount of the drug applied or use it less frequently.   The patient verbalized understanding of the proper use and possible adverse effects of topical sulfur application.  All of the patient's questions and concerns were addressed.
Detail Level: Zone
Tazorac Counseling:  Patient advised that medication is irritating and drying.  Patient may need to apply sparingly and wash off after an hour before eventually leaving it on overnight.  The patient verbalized understanding of the proper use and possible adverse effects of tazorac.  All of the patient's questions and concerns were addressed.
Sarecycline Counseling: Patient advised regarding possible photosensitivity and discoloration of the teeth, skin, lips, tongue and gums.  Patient instructed to avoid sunlight, if possible.  When exposed to sunlight, patients should wear protective clothing, sunglasses, and sunscreen.  The patient was instructed to call the office immediately if the following severe adverse effects occur:  hearing changes, easy bruising/bleeding, severe headache, or vision changes.  The patient verbalized understanding of the proper use and possible adverse effects of sarecycline.  All of the patient's questions and concerns were addressed.
Aklief Pregnancy And Lactation Text: It is unknown if this medication is safe to use during pregnancy.  It is unknown if this medication is excreted in breast milk.  Breastfeeding women should use the topical cream on the smallest area of the skin for the shortest time needed while breastfeeding.  Do not apply to nipple and areola.
Erythromycin Pregnancy And Lactation Text: This medication is Pregnancy Category B and is considered safe during pregnancy. It is also excreted in breast milk.
Bactrim Counseling:  I discussed with the patient the risks of sulfa antibiotics including but not limited to GI upset, allergic reaction, drug rash, diarrhea, dizziness, photosensitivity, and yeast infections.  Rarely, more serious reactions can occur including but not limited to aplastic anemia, agranulocytosis, methemoglobinemia, blood dyscrasias, liver or kidney failure, lung infiltrates or desquamative/blistering drug rashes.
Doxycycline Pregnancy And Lactation Text: This medication is Pregnancy Category D and not consider safe during pregnancy. It is also excreted in breast milk but is considered safe for shorter treatment courses.
Tetracycline Pregnancy And Lactation Text: This medication is Pregnancy Category D and not consider safe during pregnancy. It is also excreted in breast milk.
Topical Retinoid counseling:  Patient advised to apply a pea-sized amount only at bedtime and wait 30 minutes after washing their face before applying.  If too drying, patient may add a non-comedogenic moisturizer. The patient verbalized understanding of the proper use and possible adverse effects of retinoids.  All of the patient's questions and concerns were addressed.
Minocycline Counseling: Patient advised regarding possible photosensitivity and discoloration of the teeth, skin, lips, tongue and gums.  Patient instructed to avoid sunlight, if possible.  When exposed to sunlight, patients should wear protective clothing, sunglasses, and sunscreen.  The patient was instructed to call the office immediately if the following severe adverse effects occur:  hearing changes, easy bruising/bleeding, severe headache, or vision changes.  The patient verbalized understanding of the proper use and possible adverse effects of minocycline.  All of the patient's questions and concerns were addressed.
Birth Control Pills Counseling: Birth Control Pill Counseling: I discussed with the patient the potential side effects of OCPs including but not limited to increased risk of stroke, heart attack, thrombophlebitis, deep venous thrombosis, hepatic adenomas, breast changes, GI upset, headaches, and depression.  The patient verbalized understanding of the proper use and possible adverse effects of OCPs. All of the patient's questions and concerns were addressed.
Winlevi Counseling:  I discussed with the patient the risks of topical clascoterone including but not limited to erythema, scaling, itching, and stinging. Patient voiced their understanding.
Spironolactone Pregnancy And Lactation Text: This medication can cause feminization of the male fetus and should be avoided during pregnancy. The active metabolite is also found in breast milk.
Benzoyl Peroxide Counseling: Patient counseled that medicine may cause skin irritation and bleach clothing.  In the event of skin irritation, the patient was advised to reduce the amount of the drug applied or use it less frequently.   The patient verbalized understanding of the proper use and possible adverse effects of benzoyl peroxide.  All of the patient's questions and concerns were addressed.
High Dose Vitamin A Counseling: Side effects reviewed, pt to contact office should one occur.
Dapsone Counseling: I discussed with the patient the risks of dapsone including but not limited to hemolytic anemia, agranulocytosis, rashes, methemoglobinemia, kidney failure, peripheral neuropathy, headaches, GI upset, and liver toxicity.  Patients who start dapsone require monitoring including baseline LFTs and weekly CBCs for the first month, then every month thereafter.  The patient verbalized understanding of the proper use and possible adverse effects of dapsone.  All of the patient's questions and concerns were addressed.
Azelaic Acid Counseling: Patient counseled that medicine may cause skin irritation and to avoid applying near the eyes.  In the event of skin irritation, the patient was advised to reduce the amount of the drug applied or use it less frequently.   The patient verbalized understanding of the proper use and possible adverse effects of azelaic acid.  All of the patient's questions and concerns were addressed.
Tazorac Pregnancy And Lactation Text: This medication is not safe during pregnancy. It is unknown if this medication is excreted in breast milk.
Topical Clindamycin Pregnancy And Lactation Text: This medication is Pregnancy Category B and is considered safe during pregnancy. It is unknown if it is excreted in breast milk.
Isotretinoin Counseling: Patient should get monthly blood tests, not donate blood, not drive at night if vision affected, not share medication, and not undergo elective surgery for 6 months after tx completed. Side effects reviewed, pt to contact office should one occur.
Azithromycin Pregnancy And Lactation Text: This medication is considered safe during pregnancy and is also secreted in breast milk.
Doxycycline Counseling:  Patient counseled regarding possible photosensitivity and increased risk for sunburn.  Patient instructed to avoid sunlight, if possible.  When exposed to sunlight, patients should wear protective clothing, sunglasses, and sunscreen.  The patient was instructed to call the office immediately if the following severe adverse effects occur:  hearing changes, easy bruising/bleeding, severe headache, or vision changes.  The patient verbalized understanding of the proper use and possible adverse effects of doxycycline.  All of the patient's questions and concerns were addressed.
Aklief counseling:  Patient advised to apply a pea-sized amount only at bedtime and wait 30 minutes after washing their face before applying.  If too drying, patient may add a non-comedogenic moisturizer.  The most commonly reported side effects including irritation, redness, scaling, dryness, stinging, burning, itching, and increased risk of sunburn.  The patient verbalized understanding of the proper use and possible adverse effects of retinoids.  All of the patient's questions and concerns were addressed.
Topical Retinoid Pregnancy And Lactation Text: This medication is Pregnancy Category C. It is unknown if this medication is excreted in breast milk.
Bactrim Pregnancy And Lactation Text: This medication is Pregnancy Category D and is known to cause fetal risk.  It is also excreted in breast milk.
Topical Sulfur Applications Pregnancy And Lactation Text: This medication is Pregnancy Category C and has an unknown safety profile during pregnancy. It is unknown if this topical medication is excreted in breast milk.
Erythromycin Counseling:  I discussed with the patient the risks of erythromycin including but not limited to GI upset, allergic reaction, drug rash, diarrhea, increase in liver enzymes, and yeast infections.
Tetracycline Counseling: Patient counseled regarding possible photosensitivity and increased risk for sunburn.  Patient instructed to avoid sunlight, if possible.  When exposed to sunlight, patients should wear protective clothing, sunglasses, and sunscreen.  The patient was instructed to call the office immediately if the following severe adverse effects occur:  hearing changes, easy bruising/bleeding, severe headache, or vision changes.  The patient verbalized understanding of the proper use and possible adverse effects of tetracycline.  All of the patient's questions and concerns were addressed. Patient understands to avoid pregnancy while on therapy due to potential birth defects.
Benzoyl Peroxide Pregnancy And Lactation Text: This medication is Pregnancy Category C. It is unknown if benzoyl peroxide is excreted in breast milk.
High Dose Vitamin A Pregnancy And Lactation Text: High dose vitamin A therapy is contraindicated during pregnancy and breast feeding.
Birth Control Pills Pregnancy And Lactation Text: This medication should be avoided if pregnant and for the first 30 days post-partum.
Winlevi Pregnancy And Lactation Text: This medication is considered safe during pregnancy and breastfeeding.

## 2024-06-14 ASSESSMENT — COLUMBIA-SUICIDE SEVERITY RATING SCALE - C-SSRS
MOST RECENT DATE: 65745
LETHALITY/MEDICAL DAMAGE CODE FIRST ACTUAL ATTEMPT: MODERATE PHYSICAL DAMAGE, MEDICAL ATTENTION NEEDED
1. IN THE PAST MONTH, HAVE YOU WISHED YOU WERE DEAD OR WISHED YOU COULD GO TO SLEEP AND NOT WAKE UP?: NO
1. HAVE YOU WISHED YOU WERE DEAD OR WISHED YOU COULD GO TO SLEEP AND NOT WAKE UP?: YES
TOTAL  NUMBER OF ACTUAL ATTEMPTS LIFETIME: 4
5. HAVE YOU STARTED TO WORK OUT OR WORKED OUT THE DETAILS OF HOW TO KILL YOURSELF? DO YOU INTEND TO CARRY OUT THIS PLAN?: YES
LETHALITY/MEDICAL DAMAGE CODE MOST RECENT ACTUAL ATTEMPT: MODERATE PHYSICAL DAMAGE, MEDICAL ATTENTION NEEDED
6. HAVE YOU EVER DONE ANYTHING, STARTED TO DO ANYTHING, OR PREPARED TO DO ANYTHING TO END YOUR LIFE?: NO
TOTAL  NUMBER OF INTERRUPTED ATTEMPTS LIFETIME: 4
LETHALITY/MEDICAL DAMAGE CODE FIRST POTENTIAL ATTEMPT: BEHAVIOR LIKELY TO RESULT IN INJURY BUT NOT LIKELY TO CAUSE DEATH
FIRST ATTEMPT DATE: 64649
REASONS FOR IDEATION LIFETIME: EQUALLY TO GET ATTENTION, REVENGE, OR A REACTION FROM OTHERS AND TO END/STOP THE PAIN
3. HAVE YOU BEEN THINKING ABOUT HOW YOU MIGHT KILL YOURSELF?: YES
ATTEMPT PAST THREE MONTHS: NO
4. HAVE YOU HAD THESE THOUGHTS AND HAD SOME INTENTION OF ACTING ON THEM?: NO
ATTEMPT LIFETIME: YES
TOTAL  NUMBER OF ABORTED OR SELF INTERRUPTED ATTEMPTS LIFETIME: NO
LETHALITY/MEDICAL DAMAGE CODE MOST RECENT POTENTIAL ATTEMPT: BEHAVIOR LIKELY TO RESULT IN INJURY BUT NOT LIKELY TO CAUSE DEATH
TOTAL  NUMBER OF INTERRUPTED ATTEMPTS PAST 3 MONTHS: NO
2. HAVE YOU ACTUALLY HAD ANY THOUGHTS OF KILLING YOURSELF?: NO
TOTAL  NUMBER OF INTERRUPTED ATTEMPTS LIFETIME: YES
LETHALITY/MEDICAL DAMAGE CODE MOST LETHAL ACTUAL ATTEMPT: MODERATE PHYSICAL DAMAGE, MEDICAL ATTENTION NEEDED
2. HAVE YOU ACTUALLY HAD ANY THOUGHTS OF KILLING YOURSELF?: YES
LETHALITY/MEDICAL DAMAGE CODE MOST LETHAL POTENTIAL ATTEMPT: BEHAVIOR LIKELY TO RESULT IN INJURY BUT NOT LIKELY TO CAUSE DEATH
MOST LETHAL DATE: 65745
5. HAVE YOU STARTED TO WORK OUT OR WORKED OUT THE DETAILS OF HOW TO KILL YOURSELF? DO YOU INTEND TO CARRY OUT THIS PLAN?: NO
4. HAVE YOU HAD THESE THOUGHTS AND HAD SOME INTENTION OF ACTING ON THEM?: YES
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
## 2024-06-26 ENCOUNTER — VIRTUAL VISIT (OUTPATIENT)
Dept: PSYCHOLOGY | Facility: CLINIC | Age: 24
End: 2024-06-26
Payer: COMMERCIAL

## 2024-06-26 DIAGNOSIS — F43.10 PTSD (POST-TRAUMATIC STRESS DISORDER): Primary | ICD-10-CM

## 2024-06-26 PROCEDURE — 90837 PSYTX W PT 60 MINUTES: CPT | Mod: 95 | Performed by: COUNSELOR
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
## 2024-07-01 ENCOUNTER — VIRTUAL VISIT (OUTPATIENT)
Dept: PSYCHIATRY | Facility: CLINIC | Age: 24
End: 2024-07-01
Payer: COMMERCIAL

## 2024-07-01 DIAGNOSIS — F90.2 ATTENTION DEFICIT HYPERACTIVITY DISORDER (ADHD), COMBINED TYPE: Primary | ICD-10-CM

## 2024-07-01 DIAGNOSIS — F41.1 GENERALIZED ANXIETY DISORDER: ICD-10-CM

## 2024-07-01 DIAGNOSIS — F64.9 GENDER DYSPHORIA: ICD-10-CM

## 2024-07-01 DIAGNOSIS — F33.42 MAJOR DEPRESSIVE DISORDER, RECURRENT EPISODE, IN FULL REMISSION (H): ICD-10-CM

## 2024-07-01 PROCEDURE — G2211 COMPLEX E/M VISIT ADD ON: HCPCS | Mod: 95 | Performed by: NURSE PRACTITIONER

## 2024-07-01 PROCEDURE — 99214 OFFICE O/P EST MOD 30 MIN: CPT | Mod: 95 | Performed by: NURSE PRACTITIONER

## 2024-07-01 RX ORDER — DEXTROAMPHETAMINE SACCHARATE, AMPHETAMINE ASPARTATE MONOHYDRATE, DEXTROAMPHETAMINE SULFATE AND AMPHETAMINE SULFATE 5; 5; 5; 5 MG/1; MG/1; MG/1; MG/1
20 CAPSULE, EXTENDED RELEASE ORAL DAILY
Qty: 30 CAPSULE | Refills: 0 | Status: SHIPPED | OUTPATIENT
Start: 2024-07-01 | End: 2024-07-31

## 2024-07-01 RX ORDER — DEXTROAMPHETAMINE SACCHARATE, AMPHETAMINE ASPARTATE MONOHYDRATE, DEXTROAMPHETAMINE SULFATE AND AMPHETAMINE SULFATE 5; 5; 5; 5 MG/1; MG/1; MG/1; MG/1
20 CAPSULE, EXTENDED RELEASE ORAL DAILY
Qty: 30 CAPSULE | Refills: 0 | Status: SHIPPED | OUTPATIENT
Start: 2024-08-30 | End: 2024-09-29

## 2024-07-01 RX ORDER — SERTRALINE HYDROCHLORIDE 100 MG/1
200 TABLET, FILM COATED ORAL DAILY
Qty: 180 TABLET | Refills: 1 | Status: SHIPPED | OUTPATIENT
Start: 2024-07-01

## 2024-07-01 RX ORDER — DEXTROAMPHETAMINE SACCHARATE, AMPHETAMINE ASPARTATE MONOHYDRATE, DEXTROAMPHETAMINE SULFATE AND AMPHETAMINE SULFATE 5; 5; 5; 5 MG/1; MG/1; MG/1; MG/1
20 CAPSULE, EXTENDED RELEASE ORAL DAILY
Qty: 30 CAPSULE | Refills: 0 | Status: SHIPPED | OUTPATIENT
Start: 2024-07-31 | End: 2024-08-30

## 2024-07-01 ASSESSMENT — PAIN SCALES - GENERAL: PAINLEVEL: NO PAIN (0)

## 2024-07-01 NOTE — NURSING NOTE
Is the patient currently in the state of [place]? YES    Visit mode:VIDEO    If the visit is dropped, the patient can be reconnected by: VIDEO VISIT: Text to cell phone:   Telephone Information:   Mobile [phone]       Will anyone else be joining the visit? NO  (If patient encounters technical issues they should call [phone])    How would you like to obtain your AVS? MyChart    Are changes needed to the allergy or medication list? Pt stated no changes to allergies and Pt stated no med changes    Are refills needed on medications prescribed by this physician? YES   to be discussed at appointment.    Reason for visit: RECHLOREN CORDOVA

## 2024-07-01 NOTE — PROGRESS NOTES
Virtual Visit Details    Type of service:  Video Visit     Originating Location (pt. Location): Home    Distant Location (provider location):  Off-site  Platform used for Video Visit: Jessi     OUTPATIENT PSYCHIATRIC FOLLOW-UP      2024     Provider: EUSEBIA Pichardo CNP      Appointment Start Time: 8:28 AM 8:45 AM  Appointment End Time: 930    Name: Cindi Armas   : 2000                    Preferred Name: Cindi      Screening Tools           2024     2:15 PM 2023    10:54 AM 2023     7:43 AM   PHQ   PHQ-9 Total Score 15 1 2   Q9: Thoughts of better off dead/self-harm past 2 weeks Not at all Not at all Not at all         2024     2:16 PM 2023     9:52 AM   ARIELLE-7 SCORE   Total Score 16 (severe anxiety) 18 (severe anxiety)   Total Score 16 18     The following assessments were completed by patient for this visit:  PROMIS 10-Global Health (all questions and answers displayed):       2023     7:57 AM 2023    10:55 AM 2024     2:18 PM 2024     7:58 AM   PROMIS 10   In general, would you say your health is: Good Good Fair Good   In general, would you say your quality of life is: Very good Excellent Fair Good   In general, how would you rate your physical health? Very good Excellent Fair Excellent   In general, how would you rate your mental health, including your mood and your ability to think? Fair Excellent Fair Good   In general, how would you rate your satisfaction with your social activities and relationships? Very good Excellent Good Fair   In general, please rate how well you carry out your usual social activities and roles Good Excellent Fair Fair   To what extent are you able to carry out your everyday physical activities such as walking, climbing stairs, carrying groceries, or moving a chair? Moderately Completely Moderately Completely   In the past 7 days, how often have you been bothered by emotional problems such as feeling anxious,  depressed, or irritable? Sometimes Never Sometimes Sometimes   In the past 7 days, how would you rate your fatigue on average? Mild None Moderate Moderate   In the past 7 days, how would you rate your pain on average, where 0 means no pain, and 10 means worst imaginable pain? 0 0 0 0   In general, would you say your health is: 3 3 2 3   In general, would you say your quality of life is: 4 5 2 3   In general, how would you rate your physical health? 4 5 2 5   In general, how would you rate your mental health, including your mood and your ability to think? 2 5 2 3   In general, how would you rate your satisfaction with your social activities and relationships? 4 5 3 2   In general, please rate how well you carry out your usual social activities and roles. (This includes activities at home, at work and in your community, and responsibilities as a parent, child, spouse, employee, friend, etc.) 3 5 2 2   To what extent are you able to carry out your everyday physical activities such as walking, climbing stairs, carrying groceries, or moving a chair? 3 5 3 5   In the past 7 days, how often have you been bothered by emotional problems such as feeling anxious, depressed, or irritable? 3 1 3 3   In the past 7 days, how would you rate your fatigue on average? 2 1 3 3   In the past 7 days, how would you rate your pain on average, where 0 means no pain, and 10 means worst imaginable pain? 0 0 0 0   Global Mental Health Score 13 20 10 11   Global Physical Health Score 16 20 13 18   PROMIS TOTAL - SUBSCORES 29 40 23 29          History of Present Illness      Patient attended the session alone.     Interim History:  I last saw Cindi Armas for outpatient psychiatry follow-up on 03/01/24. During that appointment, we stopped trazodone    Current stressors include: Physical sx.     Coping mechanisms and supports include: Hobbies    Side effects: Denies    Medication adherence: Reports good med adherence. Couple weeks off  "during surgery.     Psychiatric Review of Systems      Cindi Chica Armas reports mood has been: \"Okay\".     - Working, returned from disability in June. Possibly surgery failed. May have another surgery with bed rest.  - Surgery in April.       Depression has been:   - Anhedonia  - Doesn't want to go out with people; started around May  - Started in therapy to address sx  - Overall mood has been okay.   - No irritability     Anxiety has been:  -No panic attacks  -Anxiety relatively controlled    Sleep has been:   - Overall okay  - Can initiate sleep   - International flights have been challenging.     Allison sxs: Denies    Psychosis sxs: Denies    ADHD sxs:   - Concentration harder   - Notices at work. Will take later in the day around 12p d/t later flights   - Completes one task that is only half done and then moves on to the next work task.   - \"I try to everything and do nothing\".   - Thought would adjust after being back to work.   - Without Adderall notices she binges more then usual.  - More \"hyper\". 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     PTSD sxs: Denies    SI/SIB: Denies SI, SIB, HI.       Medications Prior to Appointment       Current Outpatient Medications   Medication Sig Dispense Refill    estradiol (ESTRACE) 1 MG tablet Take 1 tablet (1 mg) by mouth daily 90 tablet 1    sertraline (ZOLOFT) 100 MG tablet Take 2 tablets (200 mg) by mouth daily 180 tablet 1        Previous medication trials include but not limited to:  - Abilify   - Adderall XR  - Concerta  - Strattera, ineffective  - minipress  - trazodone  - sertraline  - bupropion, increased anxiety                    Medication Compliance: No                 Pharmacogenomic Testing Completed: No      Medical History      History of head injuries: No  History of seizures: No  History of cardiac events: No  History of Tardive Dyskinesia: No     - HRT, 2012/2013  - Vaginoplasty, 2018  - Repair of rectovaginal fistula, 04/2024 (w / " ileostomy creation)      No past medical history on file.     Surgery: No past surgical history on file.  Primary Care Provider: Physician No Ref-Primary        Social History      Current Living situation:  [place], [place] with Roommate.    Current use of drugs or alcohol: Denies     Tobacco use: No    Employment: Yes:      Relationship Status: single     Vitals      Not obtained d/t virtual visit.     There were no vitals taken for this visit.    Labs        Most recent laboratory results reviewed and pertinent results include:   Lab on 09/13/2023   Component Date Value Ref Range Status    Cannabinoids (31-kmz-6-carboxy-9-T* 09/13/2023 Not Detected  Not Detected, Indeterminate Final    Cutoff for a negative cannabinoid is 50 ng/mL or less.    Phencyclidine 09/13/2023 Not Detected  Not Detected, Indeterminate Final    Cutoff for a negative PCP is 25 ng/mL or less.    Cocaine (Benzoylecgonine) 09/13/2023 Not Detected  Not Detected, Indeterminate Final    Cutoff for a negative cocaine is 150 ng/ml or less.    Methamphetamine (d-Methamphetamine) 09/13/2023 Not Detected  Not Detected, Indeterminate Final    Cutoff for a negative methamphetamine is 500 ng/ml or less.    Opiates (Morphine) 09/13/2023 Not Detected  Not Detected, Indeterminate Final    Cutoff for a negative opiate is 100 ng/ml or less.    Amphetamine (d-Amphetamine) 09/13/2023 Detected (A)  Not Detected, Indeterminate Final    Cutoff for a positive amphetamine is greater than 500 ng/ml.  This is an unconfirmed screening result to be used for medical purposes only.     Benzodiazepines (Nordiazepam) 09/13/2023 Not Detected  Not Detected, Indeterminate Final    Cutoff for a negative benzodiazepine is 150 ng/ml or less.    Tricyclic Antidepressants (Desipra* 09/13/2023 Not Detected  Not Detected, Indeterminate Final    Cutoff for a negative tricyclic antidepressant is 300 ng/ml or less.    Methadone 09/13/2023 Not Detected  Not Detected,  Indeterminate Final    Cutoff for a negative methadone is 200 ng/ml or less.    Barbiturates (Butalbital) 09/13/2023 Not Detected  Not Detected, Indeterminate Final    Cutoff for a negative barbituate is 200 ng/ml or less.    Oxycodone 09/13/2023 Not Detected  Not Detected, Indeterminate Final    Cutoff for a negative oxycodone is 100 ng/mL or less.    Buprenorphine 09/13/2023 Not Detected  Not Detected, Indeterminate Final    Cutoff for a negative buprenorphine is 10 ng/ml or less.   Office Visit on 08/01/2023   Component Date Value Ref Range Status    HIV Antigen Antibody Combo 08/01/2023 Nonreactive  Nonreactive Final    HIV-1 p24 Ag & HIV-1/HIV-2 Ab Not Detected    Hepatitis C Antibody 08/01/2023 Nonreactive  Nonreactive Final    Treponema Antibody Total 08/01/2023 Nonreactive  Nonreactive Final    Sodium 08/01/2023 139  136 - 145 mmol/L Final    Potassium 08/01/2023 4.1  3.4 - 5.3 mmol/L Final    Chloride 08/01/2023 103  98 - 107 mmol/L Final    Carbon Dioxide (CO2) 08/01/2023 26  22 - 29 mmol/L Final    Anion Gap 08/01/2023 10  7 - 15 mmol/L Final    Urea Nitrogen 08/01/2023 12.6  6.0 - 20.0 mg/dL Final    Creatinine 08/01/2023 0.58  0.51 - 0.95 mg/dL Final    Calcium 08/01/2023 9.0  8.6 - 10.0 mg/dL Final    Glucose 08/01/2023 93  70 - 99 mg/dL Final    Alkaline Phosphatase 08/01/2023 78  35 - 104 U/L Final    AST 08/01/2023 19  0 - 45 U/L Final    Reference intervals for this test were updated on 6/12/2023 to more accurately reflect our healthy population. There may be differences in the flagging of prior results with similar values performed with this method. Interpretation of those prior results can be made in the context of the updated reference intervals.    ALT 08/01/2023 11  0 - 50 U/L Final    Reference intervals for this test were updated on 6/12/2023 to more accurately reflect our healthy population. There may be differences in the flagging of prior results with similar values performed with this  method. Interpretation of those prior results can be made in the context of the updated reference intervals.      Protein Total 08/01/2023 7.1  6.4 - 8.3 g/dL Final    Albumin 08/01/2023 4.4  3.5 - 5.2 g/dL Final    Bilirubin Total 08/01/2023 0.5  <=1.2 mg/dL Final    GFR Estimate 08/01/2023 >90  >60 mL/min/1.73m2 Final    Estradiol 08/01/2023 <5  pg/mL Final    Healthy Men:   11.3-43.2 pg/mL    Healthy Postmenopausal Women:  Postmenopause: <5-138 pg/mL    Healthy Pregnant Women:  1st trimester: 154-3243 pg/mL  2nd trimester: 1561-20526 pg/mL  3rd trimester: 8525->98990 pg/mL    Healthy Women Cycle Phase:  Follicular: 30.9-90.4 pg/mL  Ovulation: 60.4-533 pg/mL  Luteal: 60.4-232 pg/mL    Healthy Women Cycle Sub-Phase:  Early Follicular: 20.5-62.8 pg/mL  Intermediate Follicular: 26-79.8 pg/mL  Late Follicular: 49.5-233 pg/mL  Ovulation: 60.4-602 pg/mL  Early Luteal: 51.1-179 pg/mL  Intermediate Luteal: 66.5-305 pg/mL  Late Luteal: 30.2-222 pg/mL    WBC Count 08/01/2023 5.8  4.0 - 11.0 10e3/uL Final    RBC Count 08/01/2023 4.70  3.80 - 5.20 10e6/uL Final    Hemoglobin 08/01/2023 13.5  11.7 - 15.7 g/dL Final    Hematocrit 08/01/2023 39.7  35.0 - 47.0 % Final    MCV 08/01/2023 85  78 - 100 fL Final    MCH 08/01/2023 28.7  26.5 - 33.0 pg Final    MCHC 08/01/2023 34.0  31.5 - 36.5 g/dL Final    RDW 08/01/2023 13.2  10.0 - 15.0 % Final    Platelet Count 08/01/2023 225  150 - 450 10e3/uL Final    Cholesterol 08/01/2023 169  <200 mg/dL Final    Triglycerides 08/01/2023 89  <150 mg/dL Final    Direct Measure HDL 08/01/2023 62  >=50 mg/dL Final    LDL Cholesterol Calculated 08/01/2023 89  <=100 mg/dL Final    Non HDL Cholesterol 08/01/2023 107  <130 mg/dL Final    Cannabinoids (34-xlk-8-carboxy-9-T* 08/01/2023 Not Detected  Not Detected, Indeterminate Final    Cutoff for a negative cannabinoid is 50 ng/mL or less.    Phencyclidine 08/01/2023 Not Detected  Not Detected, Indeterminate Final    Cutoff for a negative PCP is 25  ng/mL or less.    Cocaine (Benzoylecgonine) 08/01/2023 Not Detected  Not Detected, Indeterminate Final    Cutoff for a negative cocaine is 150 ng/ml or less.    Methamphetamine (d-Methamphetamine) 08/01/2023 Not Detected  Not Detected, Indeterminate Final    Cutoff for a negative methamphetamine is 500 ng/ml or less.    Opiates (Morphine) 08/01/2023 Detected (A)  Not Detected, Indeterminate Final    Cutoff for a positive opiate is greater than 100 ng/ml.  This is an unconfirmed screening result to be used for medical purposes only.     Amphetamine (d-Amphetamine) 08/01/2023 Not Detected  Not Detected, Indeterminate Final    Cutoff for a negative amphetamine is 500 ng/mL or less.    Benzodiazepines (Nordiazepam) 08/01/2023 Not Detected  Not Detected, Indeterminate Final    Cutoff for a negative benzodiazepine is 150 ng/ml or less.    Tricyclic Antidepressants (Desipra* 08/01/2023 Not Detected  Not Detected, Indeterminate Final    Cutoff for a negative tricyclic antidepressant is 300 ng/ml or less.    Methadone 08/01/2023 Not Detected  Not Detected, Indeterminate Final    Cutoff for a negative methadone is 200 ng/ml or less.    Barbiturates (Butalbital) 08/01/2023 Not Detected  Not Detected, Indeterminate Final    Cutoff for a negative barbituate is 200 ng/ml or less.    Oxycodone 08/01/2023 Not Detected  Not Detected, Indeterminate Final    Cutoff for a negative oxycodone is 100 ng/mL or less.    Propoxyphene (Norpropoxyphene) 08/01/2023 Not Detected  Not Detected, Indeterminate Final    Cutoff for a negative propoxyphene is 300 ng/ml or less.    Buprenorphine 08/01/2023 Not Detected  Not Detected, Indeterminate Final    Cutoff for a negative buprenorphine is 10 ng/ml or less.    Sex Hormone Binding Globulin 08/01/2023 24 (L)  30 - 135 nmol/L Final    Free Testosterone Calculated 08/01/2023 0.15  ng/dL Final    Adult Female Reference Range:  18-30 Years: 0.08-0.74 ng/dL  31-40 Years: 0.13-0.92 ng/dL  41-51 Years:  "0.11-0.58 ng/dL  Postmenopausal: 0.06-0.38 ng/dL    Testosterone Total 08/01/2023 7 (L)  8 - 60 ng/dL Final    Chlamydia Trachomatis 08/01/2023 Negative  Negative Final    Negative for C. trachomatis rRNA by transcription mediated amplification.   A negative result by transcription mediated amplification does not preclude the presence of infection because results are dependent on proper and adequate collection, absence of inhibitors and sufficient rRNA to be detected.    Neisseria gonorrhoeae 08/01/2023 Negative  Negative Final    Negative for N. gonorrhoeae rRNA by transcription mediated amplification. A negative result by transcription mediated amplification does not preclude the presence of C. trachomatis infection because results are dependent on proper and adequate collection, absence of inhibitors and sufficient rRNA to be detected.     EKG: Most recent EKG from 2019 reviewed. QTc interval 423.          Medical Review of Systems      Pertinent positives noted in HPI and below:   Review of Systems   All other systems reviewed and are negative.       No LMP recorded. (Menstrual status: Reassignment).         Mental Status Exam      Appearance: awake, alert, adequately groomed, appeared stated age and no apparent distress  Attitude:  cooperative   Eye Contact:  good  Gait and Station: normal, no gross abnormalities noted by observation  Psychomotor Behavior:  no evidence of tardive dyskinesia, dystonia, or tics  Oriented to:  person, place, time, and situation  Attention Span and Concentration:  normal  Speech:  clear, coherent, regular rate, rhythm, and volume  Language: intact  Mood: \"okay\"  Affect:  appropriate and in normal range  Associations:  no loose associations  Thought Process:  logical, linear and goal oriented  Thought Content:  no evidence of suicidal ideation or homicidal ideation, no evidence of psychotic thought, no auditory hallucinations present and no visual hallucinations present  Recent and " Remote Memory:  Intact to interview. Not formally assessed. No amnesia.  Fund of Knowledge: appropriate  Insight: Partial to full  Judgment:  intact, adequate for safety  Impulse Control:  intact         Risk Assessment       Updated: 7/1/2024     Suicide assessment  Acute: Low  Chronic:Low  Imminent: Low     Risk factors  History of suicide attempts: Yes  History of self-injurious behavior: Yes  Terry. Axis I psychiatric diagnoses: Yes  Substance use disorder: No  Symptoms:  impulsivity  Family history of completed suicide or attempted suicide: Yes  Accessibility to firearms: No  Interpersonal factors: Financial stress, social stress, LGBQT+     Protective factors  Ability to cope with the stress: Yes  Family responsibility and supportive:Yes  Positive therapeutic relationships: No  Social support:Yes  Adventist beliefs:  No  Connectedness with mental health providers: Yes, establishing today     Homicidal Risk  Acute: Low  Chronic: Low  Imminent: Low       Assessment     Cindi Armas reports overall managing mental health.  Initiated therapy which has been helpful.  Wanting to address anhedonia and self isolating behaviors through therapy.  If symptoms continue will look at addressing with medication.  Has noticed since returning back to work from disability that concentration/focus, task completion has been more challenging.  It is reasonable to advance Adderall XR to 20 mg by mouth every morning.  Continue discussion regarding national shortage.  No overt concerns regarding chemical health concerns.  Reviewed risk versus benefit and side effects.  No imminent safety concerns.    Pharmacologic:   -Advance Adderall XR 20 mg by mouth every morning.       -Continue sertraline 100 mg tablet, take 2 tablets by mouth every day     *Consider buspirone      Psychosocial: Would benefit from individual therapy with focus of Dialectical Behavior Therapy (DBT). DBT would be helpful in addressing emotional regulation,  distress tolerance, mindfulness, and interpersonal effectiveness.    - Started individual therapy.            Diagnosis       ADHD, combined presentation  ARIELLE with panic attacks  MDD, recurrent in partial to full remission  Cluster B traits  Gender dysphoria     PTSD by history  Borderline personality disorder by history    Rule out eating d/o        Plan         1) Medications:   -Advance Adderall XR 20 mg by mouth every morning.       -Continue sertraline 100 mg tablet, take 2 tablets by mouth every day     *Consider buspirone               MNPMP: I have queried the [place] and/or [place] Prescription Monitoring Program for this patient for the preceding 12 months, or reviewed the report provided by my proxy delegate. I have not identified any concerns.  2) Risk vs benefits of medications reviewed: Yes  3) Life style modifications: sleep hygiene, exercise, healthy diet  4) Medical concerns:    -No acute concerns  5) Other:   -Continue individual therapy    6) Refrain from drinking alcohol and/or use of drugs.   7) Please secure all prescription and OTC medications, sharps, and caustic substances. Please remove all firearms and ammunition.  8) Review outside records, get GÓMEZ's, coordinate with outside providers  9) In case of emergency call 911 or go to the nearest ER, this includes patient voicing thought of harming self or others as well as additional safety concerns   10) Follow-up: 6-8 weeks, or sooner if needed.      Administrative Billing:   Supportive therapy was provided, focusing on reflective listening and solution focused problem solving.    Total time preparing to see this patient, face-to-face time, documenting in the EHR, and coordinating care time on the same calendar date: 30 minutes    The longitudinal plan of care for the diagnosis(es)/condition(s) as documented were addressed during this visit. Due to the added complexity in care, I will continue to support Cindi in the subsequent management and with  ongoing continuity of care.        Signed:   EUSEBIA Pichardo CNP on 3/1/2024 at 9:35 AM     Disclaimer: This note consists of symbols derived from keyboarding, dictation and/or voice recognition software. As a result, there may be errors in the script that have gone undetected. Please consider this when interpreting information found in this chart.

## 2024-07-01 NOTE — PROGRESS NOTES
"Virtual Visit Details    Type of service:  Video Visit     Originating Location (pt. Location): {video visit patient location:498718::\"Home\"}  {PROVIDER LOCATION On-site should be selected for visits conducted from your clinic location or adjoining [facility] hospital, academic office, or other nearby [facility] building. Off-site should be selected for all other provider locations, including home:[id number]}  Distant Location (provider location):  {virtual location provider:[id number]}  Platform used for Video Visit: {Virtual Visit Platforms:924564::\"TouchTen\"}    "

## 2024-07-31 ENCOUNTER — PRE VISIT (OUTPATIENT)
Dept: PLASTIC SURGERY | Facility: CLINIC | Age: 24
End: 2024-07-31

## 2024-07-31 ENCOUNTER — OFFICE VISIT (OUTPATIENT)
Dept: PLASTIC SURGERY | Facility: CLINIC | Age: 24
End: 2024-07-31
Payer: COMMERCIAL

## 2024-07-31 VITALS
HEIGHT: 68 IN | HEART RATE: 77 BPM | OXYGEN SATURATION: 98 % | WEIGHT: 170 LBS | DIASTOLIC BLOOD PRESSURE: 76 MMHG | BODY MASS INDEX: 25.76 KG/M2 | SYSTOLIC BLOOD PRESSURE: 118 MMHG

## 2024-07-31 DIAGNOSIS — F64.0 GENDER DYSPHORIA IN ADULT: ICD-10-CM

## 2024-07-31 PROCEDURE — 99204 OFFICE O/P NEW MOD 45 MIN: CPT | Performed by: STUDENT IN AN ORGANIZED HEALTH CARE EDUCATION/TRAINING PROGRAM

## 2024-07-31 RX ORDER — TRAZODONE HYDROCHLORIDE 50 MG/1
100 TABLET, FILM COATED ORAL AT BEDTIME
COMMUNITY

## 2024-07-31 ASSESSMENT — PAIN SCALES - GENERAL: PAINLEVEL: NO PAIN (0)

## 2024-07-31 NOTE — PROGRESS NOTES
"PRS    HPI: 23-year-old trans female (she/her) presenting for chest feminization consultation.  Patient has been in her current gender role for at least 11-12 years.  She has been on estrogen for about 13 years.  Her breast growth has reached a plateau.  Patient seeks additional breast augmentation to improve gender dysphoria.  Patient has family including her mother, here in the [facility].  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  She does have a psychiatrist letter of support for chest feminization surgery.  In terms of goals, patient would like to have a more body appropriate breast augmentation.  Of note, she has history of poor scarring and would like to avoid scars on the breasts.  Patient denies any breast masses, lumps, nipple discharge or swelling in the armpit.    ROS: Negative, see HPI  Past medical history: Gender dysphoria  Past surgical history: Full depth vaginoplasty in 2018, complicated by colorectal fistula and need for ileostomy.  Medications: Estrogen, sertraline, Adderall  Allergies: None  Family history: Maternal aunt with breast cancer diagnosis at the age of 59 years.  No bleeding or clotting problems, or issues with anesthesia.  Social history: Non-smoker, denies any tobacco or nicotine use    Examination:  /76 (BP Location: Left arm, Patient Position: Sitting, Cuff Size: Adult Regular)   Pulse 77   Ht 1.727 m (5' 8\")   Wt 77.1 kg (170 lb)   SpO2 98%   BMI 25.85 kg/m    Nonlabored breathing  Not distressed  Bilateral grade 1 ptotic breasts with left side slightly larger than the left nipple areola slightly lower  No nipple discharge or axillary lymphadenopathy, but there is a palpable mass on the left in the upper outer quadrant with no overlying skin changes or tenderness  Breast measurements:  Sternal notch nipple distance: 23.5 cm on the left, 23 cm on the right  Breast base width: 14 cm bilaterally  Nipple inframammary crease " distance: 7 cm bilaterally, 9 cm on stretch  Nipple midline distance: 9.5 cm bilaterally  Areolar diameter: 3.5 cm bilaterally    No breast screening imaging    A/P: 23-year-old trans female presenting for chest feminization consultation    -Patient may be a good candidate for bilateral gender affirming feminizing breast augmentation.  In her case, we could either consider partial submuscular dual plane or subfascial augmentation.  Since the nipple inframammary crease distance on stretch is 9 cm, and patient has a history of poor scarring, we can try to avoid incisions on the breast by doing an augmentation alone.  Since we would like to improve the overall aesthetic shape of the breasts, and a subfascial augmentation may help expand the breast envelope better and reduce the risk of nipple ptosis, we may consider this approach.  After discussing these options, patient would like to consider this.  Patient understands that this is being considered on the basis of medical necessity to treat gender dysphoria as based on WPATH criteria.    -Reiterated risks of implants, including but not limited to: capsular contracture, implant rupture, implant malposition, double bubble deformity, animation deformity, ALLIE-ALCL, implant related illness. Reviewed the FDA checklist for implant related risks.  Patient understands these risks and is appreciative of the discussion.    Reiterated importance of implant surveillance, which includes high-definition US or MR imaging study 5-6 years after the original operation, followed by US or MR imaging every 3 years thereafter.      Discussed the typical activity restrictions following surgery.  For the first 2 weeks, limit lifting to 5-7 pounds and no activity that would elevate the heart rate for sustained periods of time above 100 bpm.  We may initiate upper pole strapping at 2 weeks.  Patient is to wear surgical bra at all times.  At the 6-week visit, we will increase activities usually  to near full activity including swimming.  At 3 months postoperatively, patient can perform all activities including push-ups.    Discussed the risks of surgery, including but not limited to: infection, bleeding, pain, poor scarring, hematoma, asymmetry, suboptimal aesthetic result, need for revision surgery, capsular contracture, implant rupture, implant malposition, double bubble deformity, animation deformity, ALLIE-ALCL, implant related illness, anesthesia-related complications, DVT/PE, death.  Despite these risks, patient would consent to bilateral feminizing breast augmentation using silicone smooth round gel implants.  All questions answered.  We will review the letter of support.  Patient will also think about everything that was discussed and let us know if we should proceed with surgery scheduling.    A total of 45 minutes was devoted to review of chart, direct face-to-face patient counseling and documentation during this encounter, exclusive of any procedure performed.    Sam Nelson MD, PhD

## 2024-07-31 NOTE — LETTER
"7/31/2024       RE: Cindi Armas  [id number]  [place] [address]         Dear Colleague,    Thank you for referring your patient, Cindi Armas, to the [facility] [place] at [facility]. Please see a copy of my visit note below.    PRS    HPI: 23-year-old trans female (she/her) presenting for chest feminization consultation.  Patient has been in her current gender role for at least 11-12 years.  She has been on estrogen for about 13 years.  Her breast growth has reached a plateau.  Patient seeks additional breast augmentation to improve gender dysphoria.  Patient has family including her mother, here in the [facility].  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  She does have a psychiatrist letter of support for chest feminization surgery.  In terms of goals, patient would like to have a more body appropriate breast augmentation.  Of note, she has history of poor scarring and would like to avoid scars on the breasts.  Patient denies any breast masses, lumps, nipple discharge or swelling in the armpit.    ROS: Negative, see HPI  Past medical history: Gender dysphoria  Past surgical history: Full depth vaginoplasty in 2018, complicated by colorectal fistula and need for ileostomy.  Medications: Estrogen, sertraline, Adderall  Allergies: None  Family history: Maternal aunt with breast cancer diagnosis at the age of 59 years.  No bleeding or clotting problems, or issues with anesthesia.  Social history: Non-smoker, denies any tobacco or nicotine use    Examination:  /76 (BP Location: Left arm, Patient Position: Sitting, Cuff Size: Adult Regular)   Pulse 77   Ht 1.727 m (5' 8\")   Wt 77.1 kg (170 lb)   SpO2 98%   BMI 25.85 kg/m    Nonlabored breathing  Not distressed  Bilateral grade 1 ptotic breasts with left side slightly larger than the left " nipple areola slightly lower  No breast masses, lumps, nipple discharge or axillary lymphadenopathy bilaterally  Breast measurements:  Sternal notch nipple distance: 23.5 cm on the left, 23 cm on the right  Breast base width: 14 cm bilaterally  Nipple inframammary crease distance: 7 cm bilaterally, 9 cm on stretch  Nipple midline distance: 9.5 cm bilaterally  Areolar diameter: 3.5 cm bilaterally    No breast screening imaging    A/P: 23-year-old trans female presenting for chest feminization consultation    -Patient may be a good candidate for bilateral gender affirming feminizing breast augmentation.  In her case, we could either consider partial submuscular dual plane or subfascial augmentation.  Since the nipple inframammary crease distance on stretch is 9 cm, and patient has a history of poor scarring, we can try to avoid incisions on the breast by doing an augmentation alone.  Since we would like to improve the overall aesthetic shape of the breasts, and a subfascial augmentation may help expand the breast envelope better and reduce the risk of nipple ptosis, we may consider this approach.  After discussing these options, patient would like to consider this.  Patient understands that this is being considered on the basis of medical necessity to treat gender dysphoria as based on WPATH criteria.    -Reiterated risks of implants, including but not limited to: capsular contracture, implant rupture, implant malposition, double bubble deformity, animation deformity, ALLIE-ALCL, implant related illness. Reviewed the FDA checklist for implant related risks.  Patient understands these risks and is appreciative of the discussion.    Reiterated importance of implant surveillance, which includes high-definition US or MR imaging study 5-6 years after the original operation, followed by US or MR imaging every 3 years thereafter.      Discussed the typical activity restrictions following surgery.  For the first 2 weeks, limit  lifting to 5-7 pounds and no activity that would elevate the heart rate for sustained periods of time above 100 bpm.  We may initiate upper pole strapping at 2 weeks.  Patient is to wear surgical bra at all times.  At the 6-week visit, we will increase activities usually to near full activity including swimming.  At 3 months postoperatively, patient can perform all activities including push-ups.    Discussed the risks of surgery, including but not limited to: infection, bleeding, pain, poor scarring, hematoma, asymmetry, suboptimal aesthetic result, need for revision surgery, capsular contracture, implant rupture, implant malposition, double bubble deformity, animation deformity, ALLIE-ALCL, implant related illness, anesthesia-related complications, DVT/PE, death.  Despite these risks, patient would consent to bilateral feminizing breast augmentation using silicone smooth round gel implants.  All questions answered.  We will review the letter of support.  Patient will also think about everything that was discussed and let us know if we should proceed with surgery scheduling.    A total of 45 minutes was devoted to review of chart, direct face-to-face patient counseling and documentation during this encounter, exclusive of any procedure performed.            Again, thank you for allowing me to participate in the care of your patient.      Sincerely,    Sam Nelson MD

## 2024-07-31 NOTE — NURSING NOTE
"Chief Complaint   Patient presents with    Consult     Gender affirming breast augmentation.       Vitals:    07/31/24 1339   BP: 118/76   BP Location: Left arm   Patient Position: Sitting   Cuff Size: Adult Regular   Pulse: 77   SpO2: 98%   Weight: 77.1 kg (170 lb)   Height: 1.727 m (5' 8\")       Body mass index is 25.85 kg/m .    Ramone Garcias EMT    "

## 2024-08-05 DIAGNOSIS — F64.0 GENDER DYSPHORIA IN ADULT: Primary | ICD-10-CM

## 2024-08-09 ENCOUNTER — OFFICE VISIT (OUTPATIENT)
Dept: PODIATRY | Facility: CLINIC | Age: 24
End: 2024-08-09
Payer: COMMERCIAL

## 2024-08-09 ENCOUNTER — ANCILLARY PROCEDURE (OUTPATIENT)
Dept: GENERAL RADIOLOGY | Facility: CLINIC | Age: 24
End: 2024-08-09
Attending: PODIATRIST
Payer: COMMERCIAL

## 2024-08-09 VITALS — BODY MASS INDEX: 25.76 KG/M2 | WEIGHT: 170 LBS | HEIGHT: 68 IN

## 2024-08-09 DIAGNOSIS — M21.41 BILATERAL PES PLANUS: ICD-10-CM

## 2024-08-09 DIAGNOSIS — M20.42 HAMMERTOE, BILATERAL: ICD-10-CM

## 2024-08-09 DIAGNOSIS — M79.672 FOOT PAIN, BILATERAL: ICD-10-CM

## 2024-08-09 DIAGNOSIS — M20.41 HAMMERTOE, BILATERAL: ICD-10-CM

## 2024-08-09 DIAGNOSIS — M79.671 FOOT PAIN, BILATERAL: ICD-10-CM

## 2024-08-09 DIAGNOSIS — M21.42 BILATERAL PES PLANUS: ICD-10-CM

## 2024-08-09 DIAGNOSIS — M79.671 FOOT PAIN, BILATERAL: Primary | ICD-10-CM

## 2024-08-09 DIAGNOSIS — M79.672 FOOT PAIN, BILATERAL: Primary | ICD-10-CM

## 2024-08-09 PROCEDURE — 73630 X-RAY EXAM OF FOOT: CPT | Mod: TC | Performed by: RADIOLOGY

## 2024-08-09 PROCEDURE — 99203 OFFICE O/P NEW LOW 30 MIN: CPT | Performed by: PODIATRIST

## 2024-08-09 NOTE — PATIENT INSTRUCTIONS
Thank you for choosing [facility] / Foot & Ankle Surgery!    DR GORDON'S CLINIC:  [facility]   [address]   [place] [address]   (Tues, Wed, Thurs AM, Fri PM)       [facility]  [address], [place] [address]  (Fri AM)       TRIAGE LINE: [phone]  APPOINTMENTS: [phone]  RADIOLOGY: [phone]  SET UP SURGERY: [phone]  PHYSICAL THERAPY: [phone]   BILLING QUESTIONS: [phone]  FAX: [phone]       Follow up: as needed      [facility]    .HAMMERTOES  Hammertoe is a contracture (bending) of one or both joints of the second, third, fourth, or fifth (little) toes. This abnormal bending can put pressure on the toe when wearing shoes, causing problems to develop.  Hammertoes usually start out as mild deformities and get progressively worse over time. In the earlier stages, hammertoes are flexible and the symptoms can often be managed with noninvasive measures. But if left untreated, hammertoes can become more rigid and will not respond to non-surgical treatment.  Because of the progressive nature of hammertoes, they should receive early attention. Hammertoes never get better without some kind of intervention.  CAUSES  The most common cause of hammertoe is a muscle/tendon imbalance. This imbalance, which leads to a bending of the toe, results from mechanical (structural) changes in the foot that occur over time in some people.  Hammertoes may be aggravated by shoes that don t fit properly. A hammertoe may result if a toe is too long and is forced into a cramped position when a tight shoe is worn.  Occasionally, hammertoe is the result of an earlier trauma to the toe. In some people, hammertoes are inherited.  SYMPTOMS  Pain or irritation of the affected toe when wearing shoes.   Corns and calluses (a buildup of skin) on the toe, between two toes, or on the ball of the foot. Corns are caused by constant friction  against the shoe. They may be soft or hard, depending upon their location.   Inflammation, redness, or a burning sensation   Contracture of the toe   In more severe cases of hammertoe, open sores may form.   DIAGNOSIS  Although hammertoes are readily apparent, to arrive at a diagnosis the foot and ankle surgeon will obtain a thorough history of your symptoms and examine your foot. During the physical examination, the doctor may attempt to reproduce your symptoms by manipulating your foot and will study the contractures of the toes. In addition, the foot and ankle surgeon may take x-rays to determine the degree of the deformities and assess any changes that may have occurred.   Hammertoes are progressive - they don t go away by themselves and usually they will get worse over time. However, not all cases are alike - some hammertoes progress more rapidly than others. Once your foot and ankle surgeon has evaluated your hammertoes, a treatment plan can be developed that is suited to your needs.  NON-SURGICAL TREATMENT  There is a variety of treatment options for hammertoe. The treatment your foot and ankle surgeon selects will depend upon the severity of your hammertoe and other factors.  A number of non-surgical measures can be undertaken:  Padding corns and calluses. Your foot and ankle surgeon can provide or prescribe pads designed to shield corns from irritation. If you want to try over-the-counter pads, avoid the medicated types. Medicated pads are generally not recommended because they may contain a small amount of acid that can be harmful. Consult your surgeon about this option.   Changes in shoewear. Avoid shoes with pointed toes, shoes that are too short, or shoes with high heels - conditions that can force your toe against the front of the shoe. Instead, choose comfortable shoes with a deep, roomy toe box and heels no higher than two inches.   Orthotic devices. A custom orthotic device placed in your shoe may  help control the muscle/tendon imbalance.   Injection therapy. Corticosteroid injections are sometimes used to ease pain and inflammation caused by hammertoe.   Medications. Oral nonsteroidal anti-inflammatory drugs (NSAIDs), such as ibuprofen, may be recommended to reduce pain and inflammation.   Splinting/strapping. Splints or small straps may be applied by the surgeon to realign the bent toe.   Exercises:   1. The Toe Tap  Stand flat on the ground in your bare feet. Raise all of your toes up off the ground as high as you can. Then starting with the little toes, slowly press them down to the ground. After the big toes are on the ground, start over by raising all of them up off the ground again. This motion is similar to tapping your fingers on a desk. Repeat this ten times.     2. Interlocking your Fingers and Toes  Cross your right foot over your knee and place the fingers of your left hand between your toes. Squeeze your toes together, pinching your fingers between them. Spread the toes apart and squeeze them together again. Repeat this ten times then do the other foot. Like most exercises, this will get easier the more you do it. If you are having a lot of difficulty with this exercise, start with just your index finger between your first and second toe, then later add your middle finger between your second and third toes, and so on until you can fit all your fingers between your toes. Do this ten times on each foot. Eventually you will be able to spread your toes apart without using your fingers.    3. Gripping the Floor   the floor by pressing the pads of your toes (not the tips) into the floor without curling your toes. Relax and repeat this ten times. If your shoes have the proper amount of depth, you should be able to do this with shoes on.    HAMMERTOE TOE SURGERY   Hammertoe surgery is complex. The surgical procedure is an attempt to help the toe lay in a better position. Nearly every structure in  the toe will be cut including the tendons, ligaments, skin and bone. Hammertoes are a complex deformity and final toe position is difficult to predict. The only sure way to position a toe is to fuse all three digital joints. That will not happen as some degree of toe motion is needed for walking. The toe may not be completely reduced as the surrounding skin and other structures may not allow the toe to return to a normal position. The tendons on adjacent toes may need to be cut at the time of the original or subsequent surgeries, as interconnections exist between the toes. The toe may drift after surgery. Stiffness may develop leading to new areas of pressure.   Future shoe choices will be critical in allowing the surgery to provide comfort. The toes will still hurt if shoes rub. The original pain may also persist as other foot problems may be contributing to the current pain. The toe may or may not be pinned in place. External pins would require complete avoidance of water on the foot for six weeks. The pin would be removed about six weeks after the surgery. Strict attention to protection is critical. The pin could get bumped or loosen resulting in early removal. Removal might be necessary before the bone heals which would negatively affect the final surgical outcome and toe allignment.      FLAT FEET   Flatfoot is often a complex disorder, with diverse symptoms and varying degrees of deformity and disability. There are several types of flatfoot, all of which have one characteristic in common: partial or total collapse (loss) of the arch.  Other characteristics shared by most types of flatfoot include:   Toe drift,  in which the toes and front part of the foot point outward   The heel tilts toward the outside and the ankle appears to turn in   A tight Achilles tendon, which causes the heel to lift off the ground earlier when walking and may make the problem worse   Bunions and hammertoes may develop as a result of  a flatfoot.   Flexible Flatfoot  Flexible flatfoot is one of the most common types of flatfoot. It typically begins in childhood or adolescence and continues into adulthood. It usually occurs in both feet and progresses in severity throughout the adult years. As the deformity worsens, the soft tissues (tendons and ligaments) of the arch may stretch or tear and can become inflamed.  The term  flexible  means that while the foot is flat when standing (weight-bearing), the arch returns when not standing.  SYMPTOMS  Pain in the heel, arch, ankle, or along the outside of the foot    Rolled-in  ankle (over-pronation)   Pain along the shin bone (shin splint)   General aching or fatigue in the foot or leg   Low back, hip or knee pain.   DIAGNOSIS  In diagnosing flatfoot, the foot and ankle surgeon examines the foot and observes how it looks when you stand and sit. X-rays are usually taken to determine the severity of the disorder. If you are diagnosed with flexible flatfoot but you don t have any symptoms, your surgeon will explain what you might expect in the future.  NON-SURGICAL TREATMENT  If you experience symptoms with flexible flatfoot, the surgeon may recommend non-surgical treatment options, including:  Activity modifications. Cut down on activities that bring you pain and avoid prolonged walking and standing to give your arches a rest.   Weight loss. If you are overweight, try to lose weight. Putting too much weight on your arches may aggravate your symptoms.   Orthotic devices. Your foot and ankle surgeon can provide you with custom orthotic devices for your shoes to give more support to the arches.   Immobilization. In some cases, it may be necessary to use a walking cast or to completely avoid weight-bearing.   Medications. Nonsteroidal anti-inflammatory drugs (NSAIDs), such as ibuprofen, help reduce pain and inflammation.   Physical therapy. Ultrasound therapy or other physical therapy modalities may be used to  provide temporary relief.   Shoe modifications. Wearing shoes that support the arches is important for anyone who has flatfoot.   SURGICAL TREATMENT  In some patients whose pain is not adequately relieved by other treatments, surgery may be considered. A variety of surgical techniques is available to correct flexible flatfoot, and one or a combination of procedures may be required to relieve the symptoms and improve foot function.  In selecting the procedure or combination of procedures for your particular case, the foot and ankle surgeon will take into consideration the extent of your deformity based on the x-ray findings, your age, your activity level, and other factors. The length of the recovery period will vary, depending on the procedure or procedures performed.        What are Prescription Custom Orthotics?  Custom orthotics are specially-made devices designed to support and comfort your feet. Prescription orthotics are crafted for you and no one else. They match the contours of your feet precisely and are designed for the way you move. Orthotics are only manufactured after a podiatrist has conducted a complete evaluation of your feet, ankles, and legs, so the orthotic can accommodate your unique foot structure and pathology.  Prescription orthotics are divided into two categories:  Functional orthotics are designed to control abnormal motion. They may be used to treat foot pain caused by abnormal motion; they can also be used to treat injuries such as shin splints or tendinitis. Functional orthotics are usually crafted of a semi-rigid material such as plastic or graphite.  Accommodative orthotics are softer and meant to provide additional cushioning and support. They can be used to treat diabetic foot ulcers, painful calluses on the bottom of the foot, and other uncomfortable conditions.  Podiatrists use orthotics to treat foot problems such as plantar fasciitis, bursitis, tendinitis, diabetic foot ulcers,  and foot, ankle, and heel pain. Clinical research studies have shown that podiatrist-prescribed foot orthotics decrease foot pain and improve function.  Orthotics typically cost more than shoe inserts purchased in a retail store, but the additional cost is usually well worth it. Unlike shoe inserts, orthotics are molded to fit each individual foot, so you can be sure that your orthotics fit and do what they're supposed to do. Prescription orthotics are also made of top-notch materials and last many years when cared for properly. Insurance often helps pay for prescription orthotics.  What are Shoe Inserts?   You've seen them at the grocery store and at the mall. You've probably even seen them on TV and online. Shoe inserts are any kind of non-prescription foot support designed to be worn inside a shoe. Pre-packaged, mass produced, arch supports are shoe inserts. So are the  custom-made  insoles and foot supports that you can order online or at retail stores. Unless the device has been prescribed by a doctor and crafted for your specific foot, it's a shoe insert, not a custom orthotic device--despite what the ads might say.  Shoe inserts can be very helpful for a variety of foot ailments, including flat arches and foot and leg pain. They can cushion your feet, provide comfort, and support your arches, but they can't correct biomechanical foot problems or cure long-standing foot issues.  The most common types of shoe inserts are:  Arch supports: Some people have high arches. Others have low arches or flat feet. Arch supports generally have a  bumped-up  appearance and are designed to support the foot's natural arch.   Insoles: Insoles slip into your shoe to provide extra cushioning and support. Insoles are often made of gel, foam, or plastic.   Heel liners: Heel liners, sometimes called heel pads or heel cups, provide extra cushioning in the heel region. They may be especially useful for patients who have foot pain  caused by age-related thinning of the heels' natural fat pads.   Foot cushions: Do your shoes rub against your heel or your toes? Foot cushions come in many different shapes and sizes and can be used as a barrier between you and your shoe.  Choosing an Over-the-Counter Shoe Insert  Selecting a shoe insert from the wide variety of devices on the market can be overwhelming. Here are some podiatrist-tested tips to help you find the insert that best meets your needs:  Consider your health. Do you have diabetes? Problems with circulation? An over-the-counter insert may not be your best bet. Diabetes and poor circulation increase your risk of foot ulcers and infections, so schedule an appointment with a podiatrist. He or she can help you select a solution that won't cause additional health problems.   Think about the purpose. Are you planning to run a marathon, or do you just need a little arch support in your work shoes? Look for a product that fits your planned level of activity.   Bring your shoes. For the insert to be effective, it has to fit into your shoes. So bring your sneakers, dress shoes, or work boots--whatever you plan to wear with your insert. Look for an insert that will fit the contours of your shoe.   Try them on. If all possible, slip the insert into your shoe and try it out. Walk around a little. How does it feel? Don't assume that feelings of pressure will go away with continued wear. (If you can't try the inserts at the store, ask about the store's return policy and hold on to your receipt.)    Please call one of the [facility] locations below to schedule an appointment. If you received a prescription please bring it with you to your appointment. Some locations are limited to what they carry.    Office Locations    [facility]  [address] [place] [address]  Home Medical Equipment, [address]   Phone: [phone]   Orthotics and Prosthetics, [address]   Phone: [phone]    [facility]  [address]   [place] [address]   Phone: [phone]    [facility]   [address]  [place] [address]  Phone: [phone]    [facility]  [address]  [address]  [place] [address]  Phone: [phone]    [facility]  [address]  [address]  [place] [address]  Phone: [phone]    [facility]  [address] [place] [address]   Phone: [phone]    [facility]  [address] [place] [address]  Phone: [phone]    [facility]  [address]  [place] [address]  Phone: [phone]

## 2024-08-09 NOTE — PROGRESS NOTES
PATIENT HISTORY:   Cindi Armas is a 23 year old female who presents to clinic for pain to both feet.  Notes it has been going on for months.  She is a  and wears heels a lot.  She broke her foot on the left 2 years ago she notes.  Pain can be 4 out of 10 at its worst.  Worse when she is on her feet for long period of time.  She is wondering what is causing the pain and what could be done for it.  Notes that she feels off balance when walking.    Review of Systems:  Patient denies fever, chills, rash, wound, stiffness, limping, numbness, weakness, heart burn, blood in stool, chest pain with activity, calf pain when walking, shortness of breath with activity, chronic cough.     PAST MEDICAL HISTORY: No past medical history on file.     PAST SURGICAL HISTORY: No past surgical history on file.     MEDICATIONS:   Current Outpatient Medications:     amphetamine-dextroamphetamine (ADDERALL XR) 20 MG 24 hr capsule, Take 1 capsule (20 mg) by mouth daily for 30 days, Disp: 30 capsule, Rfl: 0    [START ON 2024] amphetamine-dextroamphetamine (ADDERALL XR) 20 MG 24 hr capsule, Take 1 capsule (20 mg) by mouth daily for 30 days, Disp: 30 capsule, Rfl: 0    estradiol (ESTRACE) 1 MG tablet, Take 1 tablet (1 mg) by mouth daily, Disp: 90 tablet, Rfl: 1    sertraline (ZOLOFT) 100 MG tablet, Take 2 tablets (200 mg) by mouth daily, Disp: 180 tablet, Rfl: 1    traZODone (DESYREL) 50 MG tablet, Take 100 mg by mouth at bedtime, Disp: , Rfl:      ALLERGIES:  No Known Allergies     SOCIAL HISTORY:   Social History     Socioeconomic History    Marital status: Single     Spouse name: Not on file    Number of children: Not on file    Years of education: Not on file    Highest education level: Not on file   Occupational History    Not on file   Tobacco Use    Smoking status: Former     Current packs/day: 0.00     Types: Cigarettes     Quit date:      Years since quittin.6    Smokeless tobacco: Never   Vaping  Use    Vaping status: Former    Substances: Nicotine   Substance and Sexual Activity    Alcohol use: Not on file    Drug use: Not on file    Sexual activity: Not on file   Other Topics Concern    Not on file   Social History Narrative    Not on file     Social Determinants of Health     Financial Resource Strain: High Risk (1/1/2022)    [facility]    Financial Resource Strain     Difficulty of Paying Living Expenses: Not on file     Difficulty of Paying Living Expenses: Not on file   Food Insecurity: No Food Insecurity (4/2/2024)    Received from [facility]    Food Insecurity     Currently or in the past 3 months, have you worried your food would run out before you had money to buy more?: No     In the past 12 months, have you run out of food or been unable to get more?: No   Transportation Needs: No Transportation Needs (4/2/2024)    Received from [facility]    Transportation Needs     Currently or in the past 3 months, has lack of transportation kept you from medical appointments, getting food or medicine, or providing care to a family member?: Not on file     : Not on file     Medical Transportation Needs?: No     Daily Living Transportation Needs? [Peds Only] : Not on file   Physical Activity: Not on file   Stress: Not on file   Social Connections: Unknown (1/1/2022)    [facility]    Social Connections     Frequency of Communication with Friends and Family: Not on file   Interpersonal Safety: Not on file   Housing Stability: Low Risk  (12/14/2022)    [facility] from [facility], [facility]    Housing Stability     Mortgage Payment Concerns?: Not on file     Number of Places Lived in the Last Year: Not on file     Unstable Housing?: Not on file       "  FAMILY HISTORY: No family history on file.     EXAM:Vitals: Ht 1.727 m (5' 8\")   Wt 77.1 kg (170 lb)   BMI 25.85 kg/m    BMI= Body mass index is 25.85 kg/m .    General appearance: Patient is alert and fully cooperative with history & exam.  No sign of distress is noted during the visit.     Psychiatric: Affect is pleasant & appropriate.  Patient appears motivated to improve health.     Respiratory: Breathing is regular & unlabored while sitting.     HEENT: Hearing is intact to spoken word.  Speech is clear.  No gross evidence of visual impairment that would impact ambulation.     Dermatologic: Skin is intact to both lower extremities without significant lesions, rash or abrasion.  No paronychia or evidence of soft tissue infection is noted.     Vascular: DP & PT pulses are intact & regular bilaterally.  No significant edema or varicosities noted.  CFT and skin temperature is normal to both lower extremities.     Neurologic: Lower extremity sensation is intact to light touch.  No evidence of weakness or contracture in the lower extremities.  No evidence of neuropathy.     Musculoskeletal: Patient is ambulatory without assistive device or brace.  Decreased arch height bilaterally with contractures of toes 2 through 4 both feet.    Radiographs: Foot x-rays bilaterally I personally reviewed the xrays.  Decreased calcaneal inclination angle bilaterally.  Contracture of toes at the PIPJ's 2 through 5.  No fractures are noted.     ASSESSMENT:    Foot pain, bilateral  Hammertoe, bilateral  Bilateral pes planus       Medical Decision Making/Plan:  Reviewed patient's chart in [facility].We discussed causes and treatments of flat feet.  Overtime, flat feet or falling arches can become painful and possible cause tension to the posterior tibial tendon leading to tendonitis or possible tear/rupture.  Conservatively we treat these with arch supports, shoe gear, physical therapy, immobilization and sometimes, surgically such as " multiple fusions in the foot to help stabilize the foot. Surgery is quite involved and requires 6-10 weeks non weight bearing followed by 1 month of protected weight bearing.     Reviewed and discussed causes of hammertoes with patient.  Explained that this can be caused by an overpowering of muscles or by the way we walk.  Discussed conservative treatments such as orthotics, pads, shoe gear.  Explained that sometimes the flexor tendons can be cut to try and straighten the toe and reduce rubbing. This is normally done in office and patient is weight bearing in postop she for 1-2 weeks.  We also discussed surgical intervention to remove the joint and possibly fuse the toe.  Normally patient has a pin sticking out of the toe for about 6 weeks and can not get the foot wet. Patient would have to be minimal weight bearing in cam boot.      Recommend arch supports in the shoes as well as stretching and strengthening exercises.  Recommend topical pain cream over-the-counter.    Patient risk factor: Patient is at low risk for infection.        Florecita Burton DPM, Podiatry/Foot and Ankle Surgery

## 2024-08-09 NOTE — LETTER
8/9/2024      Cindi Armas  [id number]  [place] [address]      Dear Colleague,    Thank you for referring your patient, Cindi Armas, to the [facility]. Please see a copy of my visit note below.    PATIENT HISTORY:   Cindi Armas is a 23 year old female who presents to clinic for pain to both feet.  Notes it has been going on for months.  She is a  and wears heels a lot.  She broke her foot on the left 2 years ago she notes.  Pain can be 4 out of 10 at its worst.  Worse when she is on her feet for long period of time.  She is wondering what is causing the pain and what could be done for it.  Notes that she feels off balance when walking.    Review of Systems:  Patient denies fever, chills, rash, wound, stiffness, limping, numbness, weakness, heart burn, blood in stool, chest pain with activity, calf pain when walking, shortness of breath with activity, chronic cough.     PAST MEDICAL HISTORY: No past medical history on file.     PAST SURGICAL HISTORY: No past surgical history on file.     MEDICATIONS:   Current Outpatient Medications:      amphetamine-dextroamphetamine (ADDERALL XR) 20 MG 24 hr capsule, Take 1 capsule (20 mg) by mouth daily for 30 days, Disp: 30 capsule, Rfl: 0     [START ON 8/30/2024] amphetamine-dextroamphetamine (ADDERALL XR) 20 MG 24 hr capsule, Take 1 capsule (20 mg) by mouth daily for 30 days, Disp: 30 capsule, Rfl: 0     estradiol (ESTRACE) 1 MG tablet, Take 1 tablet (1 mg) by mouth daily, Disp: 90 tablet, Rfl: 1     sertraline (ZOLOFT) 100 MG tablet, Take 2 tablets (200 mg) by mouth daily, Disp: 180 tablet, Rfl: 1     traZODone (DESYREL) 50 MG tablet, Take 100 mg by mouth at bedtime, Disp: , Rfl:      ALLERGIES:  No Known Allergies     SOCIAL HISTORY:   Social History     Socioeconomic History     Marital status: Single     Spouse name: Not on file     Number of children: Not on file     Years of education: Not on  file     Highest education level: Not on file   Occupational History     Not on file   Tobacco Use     Smoking status: Former     Current packs/day: 0.00     Types: Cigarettes     Quit date:      Years since quittin.6     Smokeless tobacco: Never   Vaping Use     Vaping status: Former     Substances: Nicotine   Substance and Sexual Activity     Alcohol use: Not on file     Drug use: Not on file     Sexual activity: Not on file   Other Topics Concern     Not on file   Social History Narrative     Not on file     Social Determinants of Health     Financial Resource Strain: High Risk (2022)    [facility]    Financial Resource Strain      Difficulty of Paying Living Expenses: Not on file      Difficulty of Paying Living Expenses: Not on file   Food Insecurity: No Food Insecurity (2024)    Received from [facility]    Food Insecurity      Currently or in the past 3 months, have you worried your food would run out before you had money to buy more?: No      In the past 12 months, have you run out of food or been unable to get more?: No   Transportation Needs: No Transportation Needs (2024)    Received from [facility]    Transportation Needs      Currently or in the past 3 months, has lack of transportation kept you from medical appointments, getting food or medicine, or providing care to a family member?: Not on file      : Not on file      Medical Transportation Needs?: No      Daily Living Transportation Needs? [Peds Only] : Not on file   Physical Activity: Not on file   Stress: Not on file   Social Connections: Unknown (2022)    [facility]    Social Connections      Frequency of Communication with Friends and Family: Not on file   Interpersonal Safety: Not on file   Housing  "Stability: Low Risk  (12/14/2022)    Received from [facility], [facility]    Housing Stability      Mortgage Payment Concerns?: Not on file      Number of Places Lived in the Last Year: Not on file      Unstable Housing?: Not on file        FAMILY HISTORY: No family history on file.     EXAM:Vitals: Ht 1.727 m (5' 8\")   Wt 77.1 kg (170 lb)   BMI 25.85 kg/m    BMI= Body mass index is 25.85 kg/m .    General appearance: Patient is alert and fully cooperative with history & exam.  No sign of distress is noted during the visit.     Psychiatric: Affect is pleasant & appropriate.  Patient appears motivated to improve health.     Respiratory: Breathing is regular & unlabored while sitting.     HEENT: Hearing is intact to spoken word.  Speech is clear.  No gross evidence of visual impairment that would impact ambulation.     Dermatologic: Skin is intact to both lower extremities without significant lesions, rash or abrasion.  No paronychia or evidence of soft tissue infection is noted.     Vascular: DP & PT pulses are intact & regular bilaterally.  No significant edema or varicosities noted.  CFT and skin temperature is normal to both lower extremities.     Neurologic: Lower extremity sensation is intact to light touch.  No evidence of weakness or contracture in the lower extremities.  No evidence of neuropathy.     Musculoskeletal: Patient is ambulatory without assistive device or brace.  Decreased arch height bilaterally with contractures of toes 2 through 4 both feet.    Radiographs: Foot x-rays bilaterally I personally reviewed the xrays.  Decreased calcaneal inclination angle bilaterally.  Contracture of toes at the PIPJ's 2 through 5.  No fractures are noted.     ASSESSMENT:    Foot pain, bilateral  Hammertoe, bilateral  Bilateral pes planus       Medical Decision Making/Plan:  Reviewed patient's chart in [facility].We discussed causes and treatments of flat feet.  Overtime, flat " feet or falling arches can become painful and possible cause tension to the posterior tibial tendon leading to tendonitis or possible tear/rupture.  Conservatively we treat these with arch supports, shoe gear, physical therapy, immobilization and sometimes, surgically such as multiple fusions in the foot to help stabilize the foot. Surgery is quite involved and requires 6-10 weeks non weight bearing followed by 1 month of protected weight bearing.     Reviewed and discussed causes of hammertoes with patient.  Explained that this can be caused by an overpowering of muscles or by the way we walk.  Discussed conservative treatments such as orthotics, pads, shoe gear.  Explained that sometimes the flexor tendons can be cut to try and straighten the toe and reduce rubbing. This is normally done in office and patient is weight bearing in postop she for 1-2 weeks.  We also discussed surgical intervention to remove the joint and possibly fuse the toe.  Normally patient has a pin sticking out of the toe for about 6 weeks and can not get the foot wet. Patient would have to be minimal weight bearing in cam boot.      Recommend arch supports in the shoes as well as stretching and strengthening exercises.  Recommend topical pain cream over-the-counter.    Patient risk factor: Patient is at low risk for infection.        Florecita Burton DPM, Podiatry/Foot and Ankle Surgery      Again, thank you for allowing me to participate in the care of your patient.        Sincerely,        Florecita Burton DPM, Podiatry/Foot and Ankle Surgery

## 2024-09-22 ENCOUNTER — HEALTH MAINTENANCE LETTER (OUTPATIENT)
Age: 24
End: 2024-09-22

## 2024-11-08 ENCOUNTER — VIRTUAL VISIT (OUTPATIENT)
Dept: PSYCHIATRY | Facility: CLINIC | Age: 24
End: 2024-11-08
Payer: COMMERCIAL

## 2024-11-08 DIAGNOSIS — F64.9 GENDER DYSPHORIA: ICD-10-CM

## 2024-11-08 DIAGNOSIS — F60.9 CLUSTER B PERSONALITY DISORDER IN ADULT (H): ICD-10-CM

## 2024-11-08 DIAGNOSIS — F41.1 GENERALIZED ANXIETY DISORDER: Primary | ICD-10-CM

## 2024-11-08 DIAGNOSIS — F90.2 ATTENTION DEFICIT HYPERACTIVITY DISORDER (ADHD), COMBINED TYPE: ICD-10-CM

## 2024-11-08 DIAGNOSIS — F33.42 MAJOR DEPRESSIVE DISORDER, RECURRENT EPISODE, IN FULL REMISSION (H): ICD-10-CM

## 2024-11-08 PROCEDURE — 99213 OFFICE O/P EST LOW 20 MIN: CPT | Mod: 95 | Performed by: NURSE PRACTITIONER

## 2024-11-08 PROCEDURE — G2211 COMPLEX E/M VISIT ADD ON: HCPCS | Mod: 95 | Performed by: NURSE PRACTITIONER

## 2024-11-08 RX ORDER — DEXTROAMPHETAMINE SACCHARATE, AMPHETAMINE ASPARTATE MONOHYDRATE, DEXTROAMPHETAMINE SULFATE AND AMPHETAMINE SULFATE 5; 5; 5; 5 MG/1; MG/1; MG/1; MG/1
20 CAPSULE, EXTENDED RELEASE ORAL DAILY
Qty: 30 CAPSULE | Refills: 0 | Status: SHIPPED | OUTPATIENT
Start: 2024-12-08 | End: 2025-01-07

## 2024-11-08 RX ORDER — DEXTROAMPHETAMINE SACCHARATE, AMPHETAMINE ASPARTATE MONOHYDRATE, DEXTROAMPHETAMINE SULFATE AND AMPHETAMINE SULFATE 5; 5; 5; 5 MG/1; MG/1; MG/1; MG/1
20 CAPSULE, EXTENDED RELEASE ORAL DAILY
Qty: 30 CAPSULE | Refills: 0 | Status: SHIPPED | OUTPATIENT
Start: 2025-01-07 | End: 2025-02-06

## 2024-11-08 RX ORDER — SERTRALINE HYDROCHLORIDE 100 MG/1
200 TABLET, FILM COATED ORAL DAILY
Qty: 180 TABLET | Refills: 1 | Status: SHIPPED | OUTPATIENT
Start: 2024-11-08

## 2024-11-08 RX ORDER — DEXTROAMPHETAMINE SACCHARATE, AMPHETAMINE ASPARTATE MONOHYDRATE, DEXTROAMPHETAMINE SULFATE AND AMPHETAMINE SULFATE 5; 5; 5; 5 MG/1; MG/1; MG/1; MG/1
20 CAPSULE, EXTENDED RELEASE ORAL DAILY
Qty: 30 CAPSULE | Refills: 0 | Status: SHIPPED | OUTPATIENT
Start: 2024-11-08 | End: 2024-12-08

## 2024-11-08 ASSESSMENT — ANXIETY QUESTIONNAIRES
4. TROUBLE RELAXING: SEVERAL DAYS
7. FEELING AFRAID AS IF SOMETHING AWFUL MIGHT HAPPEN: NEARLY EVERY DAY
GAD7 TOTAL SCORE: 18
IF YOU CHECKED OFF ANY PROBLEMS ON THIS QUESTIONNAIRE, HOW DIFFICULT HAVE THESE PROBLEMS MADE IT FOR YOU TO DO YOUR WORK, TAKE CARE OF THINGS AT HOME, OR GET ALONG WITH OTHER PEOPLE: VERY DIFFICULT
2. NOT BEING ABLE TO STOP OR CONTROL WORRYING: NEARLY EVERY DAY
GAD7 TOTAL SCORE: 18
5. BEING SO RESTLESS THAT IT IS HARD TO SIT STILL: NEARLY EVERY DAY
8. IF YOU CHECKED OFF ANY PROBLEMS, HOW DIFFICULT HAVE THESE MADE IT FOR YOU TO DO YOUR WORK, TAKE CARE OF THINGS AT HOME, OR GET ALONG WITH OTHER PEOPLE?: VERY DIFFICULT
1. FEELING NERVOUS, ANXIOUS, OR ON EDGE: NEARLY EVERY DAY
3. WORRYING TOO MUCH ABOUT DIFFERENT THINGS: NEARLY EVERY DAY
6. BECOMING EASILY ANNOYED OR IRRITABLE: MORE THAN HALF THE DAYS
7. FEELING AFRAID AS IF SOMETHING AWFUL MIGHT HAPPEN: NEARLY EVERY DAY
GAD7 TOTAL SCORE: 18

## 2024-11-08 ASSESSMENT — PAIN SCALES - GENERAL: PAINLEVEL_OUTOF10: NO PAIN (0)

## 2024-11-08 ASSESSMENT — PATIENT HEALTH QUESTIONNAIRE - PHQ9
SUM OF ALL RESPONSES TO PHQ QUESTIONS 1-9: 22
SUM OF ALL RESPONSES TO PHQ QUESTIONS 1-9: 22
10. IF YOU CHECKED OFF ANY PROBLEMS, HOW DIFFICULT HAVE THESE PROBLEMS MADE IT FOR YOU TO DO YOUR WORK, TAKE CARE OF THINGS AT HOME, OR GET ALONG WITH OTHER PEOPLE: SOMEWHAT DIFFICULT

## 2024-11-08 NOTE — PROGRESS NOTES
Virtual Visit Details    Type of service:  Video Visit     Originating Location (pt. Location): Home    Distant Location (provider location):  Off-site  Platform used for Video Visit: Jessi     OUTPATIENT PSYCHIATRIC FOLLOW-UP      2024     Provider: EUSEBIA Pichardo CNP      Appointment Start Time: 3:01 PM  Appointment End Time: 3:18 PM    Name: Cindi Armas   : 2000                    Preferred Name: Cindi      Screening Tools           2024     2:34 PM 2024     2:15 PM 2023    10:54 AM   PHQ   PHQ-9 Total Score 22  15 1   Q9: Thoughts of better off dead/self-harm past 2 weeks Not at all  Not at all  Not at all        Patient-reported         2024     2:35 PM 2024     2:16 PM 2023     9:52 AM   ARIELLE-7 SCORE   Total Score 18 (severe anxiety) 16 (severe anxiety) 18 (severe anxiety)   Total Score 18  16 18       Patient-reported     The following assessments were completed by patient for this visit:  PROMIS 10-Global Health (all questions and answers displayed):       2023     7:57 AM 2023    10:55 AM 2024     2:18 PM 2024     7:58 AM 2024     8:13 AM 2024     2:37 PM   PROMIS 10   In general, would you say your health is: Good Good Fair Good Very good Good   In general, would you say your quality of life is: Very good Excellent Fair Good Very good Fair   In general, how would you rate your physical health? Very good Excellent Fair Excellent Very good Good   In general, how would you rate your mental health, including your mood and your ability to think? Fair Excellent Fair Good Very good Fair   In general, how would you rate your satisfaction with your social activities and relationships? Very good Excellent Good Fair Fair Poor   In general, please rate how well you carry out your usual social activities and roles Good Excellent Fair Fair Good Poor   To what extent are you able to carry out your everyday physical activities such as  walking, climbing stairs, carrying groceries, or moving a chair? Moderately Completely Moderately Completely Completely Completely   In the past 7 days, how often have you been bothered by emotional problems such as feeling anxious, depressed, or irritable? Sometimes Never Sometimes Sometimes Rarely Often   In the past 7 days, how would you rate your fatigue on average? Mild None Moderate Moderate Moderate Severe   In the past 7 days, how would you rate your pain on average, where 0 means no pain, and 10 means worst imaginable pain? 0 0 0 0 0 0   In general, would you say your health is: 3  3  2  3  4 3    In general, would you say your quality of life is: 4  5  2  3  4 2    In general, how would you rate your physical health? 4  5  2  5  4 3    In general, how would you rate your mental health, including your mood and your ability to think? 2  5  2  3  4 2    In general, how would you rate your satisfaction with your social activities and relationships? 4  5  3  2  2 1    In general, please rate how well you carry out your usual social activities and roles. (This includes activities at home, at work and in your community, and responsibilities as a parent, child, spouse, employee, friend, etc.) 3  5  2  2  3 1    To what extent are you able to carry out your everyday physical activities such as walking, climbing stairs, carrying groceries, or moving a chair? 3  5  3  5  5 5    In the past 7 days, how often have you been bothered by emotional problems such as feeling anxious, depressed, or irritable? 3  1  3  3  2 4    In the past 7 days, how would you rate your fatigue on average? 2  1  3  3  3 4    In the past 7 days, how would you rate your pain on average, where 0 means no pain, and 10 means worst imaginable pain? 0  0  0  0  0 0    Global Mental Health Score 13 20 10 11 14 7    Global Physical Health Score 16 20 13 18 17 15    PROMIS TOTAL - SUBSCORES 29 40 23 29 31 22        Patient-reported          History of  "Present Illness      Patient attended the session alone.     Interim History:  I last saw Cindi Armas for outpatient psychiatry follow-up on 07/01/24. During that appointment, we advanced adderall    Current stressors include: Physical sx.     Coping mechanisms and supports include: Hobbies    Side effects: Denies    Medication adherence: Reports good med adherence.     Psychiatric Review of Systems      Cindi Armas reports mood has been: \"Okay\".     - Lost weight, sick more often.   - Appetite has been challenging. No purging. Poor relationship with food.     Depression has been:   - Last week with birthday has been isolating.     Anxiety has been:  - Anxiety with work; feels retaliated against   - Stress of others with election.     Sleep has been:   - Can vary; overall sleeps excessively.   - Grandmother is ailing.   - Work impact sleep    Allison sxs: Denies    Psychosis sxs: Denies    ADHD sxs:   - Advanced Adderall. Took some time to get use to (insomnia)  - Improved concentration  - Less fidgety   - Can go through the day without crash  - Around 2pm notices shift     PTSD sxs: Denies    SI/SIB: Denies SI, SIB, HI.       Medications Prior to Appointment       Current Outpatient Medications   Medication Sig Dispense Refill    amphetamine-dextroamphetamine (ADDERALL XR) 20 MG 24 hr capsule Take 1 capsule (20 mg) by mouth daily. 30 capsule 0    [START ON 12/8/2024] amphetamine-dextroamphetamine (ADDERALL XR) 20 MG 24 hr capsule Take 1 capsule (20 mg) by mouth daily. 30 capsule 0    [START ON 1/7/2025] amphetamine-dextroamphetamine (ADDERALL XR) 20 MG 24 hr capsule Take 1 capsule (20 mg) by mouth daily. 30 capsule 0    sertraline (ZOLOFT) 100 MG tablet Take 2 tablets (200 mg) by mouth daily. 180 tablet 1    estradiol (ESTRACE) 1 MG tablet Take 1 tablet (1 mg) by mouth daily 90 tablet 1    traZODone (DESYREL) 50 MG tablet Take 100 mg by mouth at bedtime          Previous medication trials include but " not limited to:  - Abilify   - Adderall XR  - Concerta  - Strattera, ineffective  - minipress  - trazodone  - sertraline  - bupropion, increased anxiety                    Medication Compliance: No                 Pharmacogenomic Testing Completed: No      Medical History      History of head injuries: No  History of seizures: No  History of cardiac events: No  History of Tardive Dyskinesia: No     - HRT, 2012/2013  - Vaginoplasty, 2018  - Repair of rectovaginal fistula, 04/2024 (w / ileostomy creation)      No past medical history on file.     Surgery: No past surgical history on file.  Primary Care Provider: Physician No Ref-Primary        Social History      Current Living situation:  [place], [place] with Roommate.    Current use of drugs or alcohol: Denies     Tobacco use: No    Employment: Yes:      Relationship Status: single     Vitals      Not obtained d/t virtual visit.     There were no vitals taken for this visit.    Labs        Most recent laboratory results reviewed and pertinent results include:   Lab on 09/13/2023   Component Date Value Ref Range Status    Cannabinoids (22-ndm-2-carboxy-9-T* 09/13/2023 Not Detected  Not Detected, Indeterminate Final    Cutoff for a negative cannabinoid is 50 ng/mL or less.    Phencyclidine 09/13/2023 Not Detected  Not Detected, Indeterminate Final    Cutoff for a negative PCP is 25 ng/mL or less.    Cocaine (Benzoylecgonine) 09/13/2023 Not Detected  Not Detected, Indeterminate Final    Cutoff for a negative cocaine is 150 ng/ml or less.    Methamphetamine (d-Methamphetamine) 09/13/2023 Not Detected  Not Detected, Indeterminate Final    Cutoff for a negative methamphetamine is 500 ng/ml or less.    Opiates (Morphine) 09/13/2023 Not Detected  Not Detected, Indeterminate Final    Cutoff for a negative opiate is 100 ng/ml or less.    Amphetamine (d-Amphetamine) 09/13/2023 Detected (A)  Not Detected, Indeterminate Final    Cutoff for a positive amphetamine  is greater than 500 ng/ml.  This is an unconfirmed screening result to be used for medical purposes only.     Benzodiazepines (Nordiazepam) 09/13/2023 Not Detected  Not Detected, Indeterminate Final    Cutoff for a negative benzodiazepine is 150 ng/ml or less.    Tricyclic Antidepressants (Desipra* 09/13/2023 Not Detected  Not Detected, Indeterminate Final    Cutoff for a negative tricyclic antidepressant is 300 ng/ml or less.    Methadone 09/13/2023 Not Detected  Not Detected, Indeterminate Final    Cutoff for a negative methadone is 200 ng/ml or less.    Barbiturates (Butalbital) 09/13/2023 Not Detected  Not Detected, Indeterminate Final    Cutoff for a negative barbituate is 200 ng/ml or less.    Oxycodone 09/13/2023 Not Detected  Not Detected, Indeterminate Final    Cutoff for a negative oxycodone is 100 ng/mL or less.    Buprenorphine 09/13/2023 Not Detected  Not Detected, Indeterminate Final    Cutoff for a negative buprenorphine is 10 ng/ml or less.   Office Visit on 08/01/2023   Component Date Value Ref Range Status    HIV Antigen Antibody Combo 08/01/2023 Nonreactive  Nonreactive Final    HIV-1 p24 Ag & HIV-1/HIV-2 Ab Not Detected    Hepatitis C Antibody 08/01/2023 Nonreactive  Nonreactive Final    Treponema Antibody Total 08/01/2023 Nonreactive  Nonreactive Final    Sodium 08/01/2023 139  136 - 145 mmol/L Final    Potassium 08/01/2023 4.1  3.4 - 5.3 mmol/L Final    Chloride 08/01/2023 103  98 - 107 mmol/L Final    Carbon Dioxide (CO2) 08/01/2023 26  22 - 29 mmol/L Final    Anion Gap 08/01/2023 10  7 - 15 mmol/L Final    Urea Nitrogen 08/01/2023 12.6  6.0 - 20.0 mg/dL Final    Creatinine 08/01/2023 0.58  0.51 - 0.95 mg/dL Final    Calcium 08/01/2023 9.0  8.6 - 10.0 mg/dL Final    Glucose 08/01/2023 93  70 - 99 mg/dL Final    Alkaline Phosphatase 08/01/2023 78  35 - 104 U/L Final    AST 08/01/2023 19  0 - 45 U/L Final    Reference intervals for this test were updated on 6/12/2023 to more accurately reflect  our healthy population. There may be differences in the flagging of prior results with similar values performed with this method. Interpretation of those prior results can be made in the context of the updated reference intervals.    ALT 08/01/2023 11  0 - 50 U/L Final    Reference intervals for this test were updated on 6/12/2023 to more accurately reflect our healthy population. There may be differences in the flagging of prior results with similar values performed with this method. Interpretation of those prior results can be made in the context of the updated reference intervals.      Protein Total 08/01/2023 7.1  6.4 - 8.3 g/dL Final    Albumin 08/01/2023 4.4  3.5 - 5.2 g/dL Final    Bilirubin Total 08/01/2023 0.5  <=1.2 mg/dL Final    GFR Estimate 08/01/2023 >90  >60 mL/min/1.73m2 Final    Estradiol 08/01/2023 <5  pg/mL Final    Healthy Men:   11.3-43.2 pg/mL    Healthy Postmenopausal Women:  Postmenopause: <5-138 pg/mL    Healthy Pregnant Women:  1st trimester: 154-3243 pg/mL  2nd trimester: 1561-11767 pg/mL  3rd trimester: 8525->24183 pg/mL    Healthy Women Cycle Phase:  Follicular: 30.9-90.4 pg/mL  Ovulation: 60.4-533 pg/mL  Luteal: 60.4-232 pg/mL    Healthy Women Cycle Sub-Phase:  Early Follicular: 20.5-62.8 pg/mL  Intermediate Follicular: 26-79.8 pg/mL  Late Follicular: 49.5-233 pg/mL  Ovulation: 60.4-602 pg/mL  Early Luteal: 51.1-179 pg/mL  Intermediate Luteal: 66.5-305 pg/mL  Late Luteal: 30.2-222 pg/mL    WBC Count 08/01/2023 5.8  4.0 - 11.0 10e3/uL Final    RBC Count 08/01/2023 4.70  3.80 - 5.20 10e6/uL Final    Hemoglobin 08/01/2023 13.5  11.7 - 15.7 g/dL Final    Hematocrit 08/01/2023 39.7  35.0 - 47.0 % Final    MCV 08/01/2023 85  78 - 100 fL Final    MCH 08/01/2023 28.7  26.5 - 33.0 pg Final    MCHC 08/01/2023 34.0  31.5 - 36.5 g/dL Final    RDW 08/01/2023 13.2  10.0 - 15.0 % Final    Platelet Count 08/01/2023 225  150 - 450 10e3/uL Final    Cholesterol 08/01/2023 169  <200 mg/dL Final     Triglycerides 08/01/2023 89  <150 mg/dL Final    Direct Measure HDL 08/01/2023 62  >=50 mg/dL Final    LDL Cholesterol Calculated 08/01/2023 89  <=100 mg/dL Final    Non HDL Cholesterol 08/01/2023 107  <130 mg/dL Final    Cannabinoids (00-ehk-9-carboxy-9-T* 08/01/2023 Not Detected  Not Detected, Indeterminate Final    Cutoff for a negative cannabinoid is 50 ng/mL or less.    Phencyclidine 08/01/2023 Not Detected  Not Detected, Indeterminate Final    Cutoff for a negative PCP is 25 ng/mL or less.    Cocaine (Benzoylecgonine) 08/01/2023 Not Detected  Not Detected, Indeterminate Final    Cutoff for a negative cocaine is 150 ng/ml or less.    Methamphetamine (d-Methamphetamine) 08/01/2023 Not Detected  Not Detected, Indeterminate Final    Cutoff for a negative methamphetamine is 500 ng/ml or less.    Opiates (Morphine) 08/01/2023 Detected (A)  Not Detected, Indeterminate Final    Cutoff for a positive opiate is greater than 100 ng/ml.  This is an unconfirmed screening result to be used for medical purposes only.     Amphetamine (d-Amphetamine) 08/01/2023 Not Detected  Not Detected, Indeterminate Final    Cutoff for a negative amphetamine is 500 ng/mL or less.    Benzodiazepines (Nordiazepam) 08/01/2023 Not Detected  Not Detected, Indeterminate Final    Cutoff for a negative benzodiazepine is 150 ng/ml or less.    Tricyclic Antidepressants (Desipra* 08/01/2023 Not Detected  Not Detected, Indeterminate Final    Cutoff for a negative tricyclic antidepressant is 300 ng/ml or less.    Methadone 08/01/2023 Not Detected  Not Detected, Indeterminate Final    Cutoff for a negative methadone is 200 ng/ml or less.    Barbiturates (Butalbital) 08/01/2023 Not Detected  Not Detected, Indeterminate Final    Cutoff for a negative barbituate is 200 ng/ml or less.    Oxycodone 08/01/2023 Not Detected  Not Detected, Indeterminate Final    Cutoff for a negative oxycodone is 100 ng/mL or less.    Propoxyphene (Norpropoxyphene) 08/01/2023  Not Detected  Not Detected, Indeterminate Final    Cutoff for a negative propoxyphene is 300 ng/ml or less.    Buprenorphine 08/01/2023 Not Detected  Not Detected, Indeterminate Final    Cutoff for a negative buprenorphine is 10 ng/ml or less.    Sex Hormone Binding Globulin 08/01/2023 24 (L)  30 - 135 nmol/L Final    Free Testosterone Calculated 08/01/2023 0.15  ng/dL Final    Adult Female Reference Range:  18-30 Years: 0.08-0.74 ng/dL  31-40 Years: 0.13-0.92 ng/dL  41-51 Years: 0.11-0.58 ng/dL  Postmenopausal: 0.06-0.38 ng/dL    Testosterone Total 08/01/2023 7 (L)  8 - 60 ng/dL Final    Chlamydia Trachomatis 08/01/2023 Negative  Negative Final    Negative for C. trachomatis rRNA by transcription mediated amplification.   A negative result by transcription mediated amplification does not preclude the presence of infection because results are dependent on proper and adequate collection, absence of inhibitors and sufficient rRNA to be detected.    Neisseria gonorrhoeae 08/01/2023 Negative  Negative Final    Negative for N. gonorrhoeae rRNA by transcription mediated amplification. A negative result by transcription mediated amplification does not preclude the presence of C. trachomatis infection because results are dependent on proper and adequate collection, absence of inhibitors and sufficient rRNA to be detected.     EKG: Most recent EKG from 2019 reviewed. QTc interval 423.          Medical Review of Systems      Pertinent positives noted in HPI and below:   Review of Systems   All other systems reviewed and are negative.       No LMP recorded. (Menstrual status: Reassignment).         Mental Status Exam      Appearance: awake, alert, adequately groomed, appeared stated age and no apparent distress  Attitude:  cooperative   Eye Contact:  good  Gait and Station: normal, no gross abnormalities noted by observation  Psychomotor Behavior:  no evidence of tardive dyskinesia, dystonia, or tics  Oriented to:  person,  "place, time, and situation  Attention Span and Concentration:  normal  Speech:  clear, coherent, regular rate, rhythm, and volume  Language: intact  Mood: \"Tired\"  Affect:  appropriate and in normal range  Associations:  no loose associations  Thought Process:  logical, linear and goal oriented  Thought Content:  no evidence of suicidal ideation or homicidal ideation, no evidence of psychotic thought, no auditory hallucinations present and no visual hallucinations present  Recent and Remote Memory:  Intact to interview. Not formally assessed. No amnesia.  Fund of Knowledge: appropriate  Insight: Partial to full  Judgment:  intact, adequate for safety  Impulse Control:  intact         Risk Assessment         Suicide assessment  Acute: Low  Chronic:Low  Imminent: Low     Risk factors  History of suicide attempts: Yes  History of self-injurious behavior: Yes  Terry. Axis I psychiatric diagnoses: Yes  Substance use disorder: No  Symptoms:  impulsivity  Family history of completed suicide or attempted suicide: Yes  Accessibility to firearms: No  Interpersonal factors: Financial stress, social stress, LGBQT+     Protective factors  Ability to cope with the stress: Yes  Family responsibility and supportive:Yes  Positive therapeutic relationships: No  Social support:Yes  Tenriism beliefs:  No  Connectedness with mental health providers: Yes, establishing today     Homicidal Risk  Acute: Low  Chronic: Low  Imminent: Low       Assessment     Cindi Armas reports monitoring symptoms of depression and anxiety.  Is able to identify various stressors contributing to mood and anxiety.  Would like to meet at the end of November.  Discussed that we will schedule for December based off of providers availability unfortunately will place on wait list and patient will communicate via [employer] messaging if unable to be seen from weight loss.  Patient is in agreement with this plan.  Consideration to buspirone versus cross-taper to a " different antidepressant.  She will also reach out to therapy which she has missed and has been less consistent with due to scheduling issues.  Will continue Adderall XR.  Well is taking it routinely but had rationed it due to appt being later then she hoped.  No imminent safety concerns identified.    Pharmacologic:   7/1/2024 + Adderall XR 20 mg    -Adderall XR 20 mg by mouth every morning.    -Sertraline 100 mg tablet, take 2 tablets by mouth every day  -Trazodone 50mg at bedtime as needed, rare use    *Consider buspirone vs cross taper     Psychosocial: Would benefit from individual therapy with focus of Dialectical Behavior Therapy (DBT). DBT would be helpful in addressing emotional regulation, distress tolerance, mindfulness, and interpersonal effectiveness.    - Missing therapy appts. Feels it is a good fit. Encouraged to reach out to schedule           Diagnosis       ADHD, combined presentation  ARIELLE with panic attacks  MDD, recurrent in partial to full remission  Cluster B traits  Gender dysphoria     PTSD by history  Borderline personality disorder by history    Rule out eating d/o        Plan         1) Medications:   -Adderall XR 20 mg by mouth every morning.    -Sertraline 100 mg tablet, take 2 tablets by mouth every day  -Trazodone 50mg at bedtime as needed, rare use              [place]NPMP: I have queried the [place] and/or [place] Prescription Monitoring Program for this patient for the preceding 12 months, or reviewed the report provided by my proxy delegate. I have not identified any concerns.  2) Risk vs benefits of medications reviewed: Yes  3) Life style modifications: sleep hygiene, exercise, healthy diet  4) Medical concerns:    -No acute concerns  5) Other:   -Continue individual therapy, needs to re schedule    6) Refrain from drinking alcohol and/or use of drugs.   7) Please secure all prescription and OTC medications, sharps, and caustic substances. Please remove all firearms and ammunition.  8) Review  outside records, get GÓMEZ's, coordinate with outside providers  9) In case of emergency call 911 or go to the nearest ER, this includes patient voicing thought of harming self or others as well as additional safety concerns   10) Follow-up: 3-4 weeks, or sooner if needed.      Administrative Billing:   Supportive therapy was provided, focusing on reflective listening and solution focused problem solving.    Total time preparing to see this patient, face-to-face time, documenting in the EHR, and coordinating care time on the same calendar date: 26 minutes    The longitudinal plan of care for the diagnosis(es)/condition(s) as documented were addressed during this visit. Due to the added complexity in care, I will continue to support Cindi in the subsequent management and with ongoing continuity of care.        Signed:   EUSEBIA Pichardo CNP on 11/8/2024 at 3:22 PM      Disclaimer: This note consists of symbols derived from keyboarding, dictation and/or voice recognition software. As a result, there may be errors in the script that have gone undetected. Please consider this when interpreting information found in this chart.

## 2024-11-08 NOTE — NURSING NOTE
Current patient location: [id number]  [facility] [address]    Is the patient currently in the state of [place]? NO    Visit mode:VIDEO    If the visit is dropped, the patient can be reconnected by: VIDEO VISIT: Text to cell phone:   Telephone Information:   Mobile [phone]       Will anyone else be joining the visit? NO  (If patient encounters technical issues they should call [phone])    Are changes needed to the allergy or medication list? No    Are refills needed on medications prescribed by this physician? NO    Rooming Documentation:  Questionnaire(s) completed    Reason for visit: RECHECK    Juarez GAOF

## 2024-11-25 ENCOUNTER — VIRTUAL VISIT (OUTPATIENT)
Dept: PSYCHIATRY | Facility: CLINIC | Age: 24
End: 2024-11-25
Payer: COMMERCIAL

## 2024-11-25 DIAGNOSIS — F33.42 MAJOR DEPRESSIVE DISORDER, RECURRENT EPISODE, IN FULL REMISSION (H): ICD-10-CM

## 2024-11-25 DIAGNOSIS — F90.2 ATTENTION DEFICIT HYPERACTIVITY DISORDER (ADHD), COMBINED TYPE: Primary | ICD-10-CM

## 2024-11-25 DIAGNOSIS — F64.9 GENDER DYSPHORIA: ICD-10-CM

## 2024-11-25 DIAGNOSIS — F41.1 GENERALIZED ANXIETY DISORDER: ICD-10-CM

## 2024-11-25 DIAGNOSIS — F60.9 CLUSTER B PERSONALITY DISORDER IN ADULT (H): ICD-10-CM

## 2024-11-25 PROCEDURE — 99214 OFFICE O/P EST MOD 30 MIN: CPT | Mod: 95 | Performed by: NURSE PRACTITIONER

## 2024-11-25 PROCEDURE — G2211 COMPLEX E/M VISIT ADD ON: HCPCS | Mod: 95 | Performed by: NURSE PRACTITIONER

## 2024-11-25 RX ORDER — SERTRALINE HYDROCHLORIDE 100 MG/1
150 TABLET, FILM COATED ORAL DAILY
Qty: 135 TABLET | Refills: 1 | Status: SHIPPED | OUTPATIENT
Start: 2024-11-25

## 2024-11-25 ASSESSMENT — PATIENT HEALTH QUESTIONNAIRE - PHQ9
SUM OF ALL RESPONSES TO PHQ QUESTIONS 1-9: 9
10. IF YOU CHECKED OFF ANY PROBLEMS, HOW DIFFICULT HAVE THESE PROBLEMS MADE IT FOR YOU TO DO YOUR WORK, TAKE CARE OF THINGS AT HOME, OR GET ALONG WITH OTHER PEOPLE: SOMEWHAT DIFFICULT
SUM OF ALL RESPONSES TO PHQ QUESTIONS 1-9: 9

## 2024-11-25 ASSESSMENT — PAIN SCALES - GENERAL: PAINLEVEL_OUTOF10: NO PAIN (0)

## 2024-11-25 NOTE — PROGRESS NOTES
Virtual Visit Details    Type of service:  Video Visit     Originating Location (pt. Location): Home    Distant Location (provider location):  Off-site  Platform used for Video Visit: Jessi     OUTPATIENT PSYCHIATRIC FOLLOW-UP      2024     Provider: EUSEBIA Pichardo CNP      Appointment Start Time: 137 p  Appointment End Time: 158 p    Name: Cindi Armas   : 2000                    Preferred Name: Cindi      Screening Tools           2024     1:17 PM 2024     2:34 PM 2024     2:15 PM   PHQ   PHQ-9 Total Score 9  22  15   Q9: Thoughts of better off dead/self-harm past 2 weeks Not at all  Not at all  Not at all        Patient-reported         2024     2:35 PM 2024     2:16 PM 2023     9:52 AM   ARIELLE-7 SCORE   Total Score 18 (severe anxiety) 16 (severe anxiety) 18 (severe anxiety)   Total Score 18  16 18       Patient-reported     The following assessments were completed by patient for this visit:  PROMIS 10-Global Health (all questions and answers displayed):       2023     7:57 AM 2023    10:55 AM 2024     2:18 PM 2024     7:58 AM 2024     8:13 AM 2024     2:37 PM   PROMIS 10   In general, would you say your health is: Good Good Fair Good Very good Good   In general, would you say your quality of life is: Very good Excellent Fair Good Very good Fair   In general, how would you rate your physical health? Very good Excellent Fair Excellent Very good Good   In general, how would you rate your mental health, including your mood and your ability to think? Fair Excellent Fair Good Very good Fair   In general, how would you rate your satisfaction with your social activities and relationships? Very good Excellent Good Fair Fair Poor   In general, please rate how well you carry out your usual social activities and roles Good Excellent Fair Fair Good Poor   To what extent are you able to carry out your everyday physical activities such as  walking, climbing stairs, carrying groceries, or moving a chair? Moderately Completely Moderately Completely Completely Completely   In the past 7 days, how often have you been bothered by emotional problems such as feeling anxious, depressed, or irritable? Sometimes Never Sometimes Sometimes Rarely Often   In the past 7 days, how would you rate your fatigue on average? Mild None Moderate Moderate Moderate Severe   In the past 7 days, how would you rate your pain on average, where 0 means no pain, and 10 means worst imaginable pain? 0 0 0 0 0 0   In general, would you say your health is: 3  3  2  3  4 3    In general, would you say your quality of life is: 4  5  2  3  4 2    In general, how would you rate your physical health? 4  5  2  5  4 3    In general, how would you rate your mental health, including your mood and your ability to think? 2  5  2  3  4 2    In general, how would you rate your satisfaction with your social activities and relationships? 4  5  3  2  2 1    In general, please rate how well you carry out your usual social activities and roles. (This includes activities at home, at work and in your community, and responsibilities as a parent, child, spouse, employee, friend, etc.) 3  5  2  2  3 1    To what extent are you able to carry out your everyday physical activities such as walking, climbing stairs, carrying groceries, or moving a chair? 3  5  3  5  5 5    In the past 7 days, how often have you been bothered by emotional problems such as feeling anxious, depressed, or irritable? 3  1  3  3  2 4    In the past 7 days, how would you rate your fatigue on average? 2  1  3  3  3 4    In the past 7 days, how would you rate your pain on average, where 0 means no pain, and 10 means worst imaginable pain? 0  0  0  0  0 0    Global Mental Health Score 13 20 10 11 14 7    Global Physical Health Score 16 20 13 18 17 15    PROMIS TOTAL - SUBSCORES 29 40 23 29 31 22        Patient-reported          History of  "Present Illness      Patient attended the session alone.     Interim History:  I last saw Cindi Armas for outpatient psychiatry follow-up on 11/08/2024. During that appointment, we continued plan.     Current stressors include: Physical sx.     Coping mechanisms and supports include: Hobbies    Side effects: Denies    Medication adherence: Reports good med adherence.     Psychiatric Review of Systems      Cindi Armas reports mood has been: \"Okay\".     - Job has been stressful. Will be displaced in January. Might have to move to [place].     - Lost weight, sick more often.   - Appetite has been challenging. No purging. Poor relationship with food.     Depression has been:   - Isolation continues    Anxiety has been:  - Anxiety heightened in general, work factors not helping.   - Grandma is sick.   - On edge feeling.     Sleep has been:   - Sleep stable.   - Excessive at times.     Allison sxs: Denies    Psychosis sxs: Denies    ADHD sxs:   - Stable    PTSD sxs: Denies    SI/SIB: Denies SI, SIB, HI.       Medications Prior to Appointment       Current Outpatient Medications   Medication Sig Dispense Refill    amphetamine-dextroamphetamine (ADDERALL XR) 20 MG 24 hr capsule Take 1 capsule (20 mg) by mouth daily. 30 capsule 0    [START ON 12/8/2024] amphetamine-dextroamphetamine (ADDERALL XR) 20 MG 24 hr capsule Take 1 capsule (20 mg) by mouth daily. 30 capsule 0    [START ON 1/7/2025] amphetamine-dextroamphetamine (ADDERALL XR) 20 MG 24 hr capsule Take 1 capsule (20 mg) by mouth daily. 30 capsule 0    estradiol (ESTRACE) 1 MG tablet Take 1 tablet (1 mg) by mouth daily 90 tablet 1    sertraline (ZOLOFT) 100 MG tablet Take 2 tablets (200 mg) by mouth daily. 180 tablet 1    traZODone (DESYREL) 50 MG tablet Take 100 mg by mouth at bedtime          Previous medication trials include but not limited to:  - Abilify   - Adderall XR  - Concerta  - Strattera, ineffective  - minipress  - trazodone  - sertraline  - " bupropion, increased anxiety                    Medication Compliance: No                 Pharmacogenomic Testing Completed: No      Medical History      History of head injuries: No  History of seizures: No  History of cardiac events: No  History of Tardive Dyskinesia: No     - HRT, 2012/2013  - Vaginoplasty, 2018  - Repair of rectovaginal fistula, 04/2024 (w / ileostomy creation)      No past medical history on file.     Surgery: No past surgical history on file.  Primary Care Provider: Physician No Ref-Primary        Social History      Current Living situation:  [place], [place] with Roommate.    Current use of drugs or alcohol: Denies     Tobacco use: No    Employment: Yes:      Relationship Status: single     Vitals      Not obtained d/t virtual visit.     There were no vitals taken for this visit.    Labs        Most recent laboratory results reviewed and pertinent results include:   Lab on 09/13/2023   Component Date Value Ref Range Status    Cannabinoids (95-eev-5-carboxy-9-T* 09/13/2023 Not Detected  Not Detected, Indeterminate Final    Cutoff for a negative cannabinoid is 50 ng/mL or less.    Phencyclidine 09/13/2023 Not Detected  Not Detected, Indeterminate Final    Cutoff for a negative PCP is 25 ng/mL or less.    Cocaine (Benzoylecgonine) 09/13/2023 Not Detected  Not Detected, Indeterminate Final    Cutoff for a negative cocaine is 150 ng/ml or less.    Methamphetamine (d-Methamphetamine) 09/13/2023 Not Detected  Not Detected, Indeterminate Final    Cutoff for a negative methamphetamine is 500 ng/ml or less.    Opiates (Morphine) 09/13/2023 Not Detected  Not Detected, Indeterminate Final    Cutoff for a negative opiate is 100 ng/ml or less.    Amphetamine (d-Amphetamine) 09/13/2023 Detected (A)  Not Detected, Indeterminate Final    Cutoff for a positive amphetamine is greater than 500 ng/ml.  This is an unconfirmed screening result to be used for medical purposes only.      Benzodiazepines (Nordiazepam) 09/13/2023 Not Detected  Not Detected, Indeterminate Final    Cutoff for a negative benzodiazepine is 150 ng/ml or less.    Tricyclic Antidepressants (Desipra* 09/13/2023 Not Detected  Not Detected, Indeterminate Final    Cutoff for a negative tricyclic antidepressant is 300 ng/ml or less.    Methadone 09/13/2023 Not Detected  Not Detected, Indeterminate Final    Cutoff for a negative methadone is 200 ng/ml or less.    Barbiturates (Butalbital) 09/13/2023 Not Detected  Not Detected, Indeterminate Final    Cutoff for a negative barbituate is 200 ng/ml or less.    Oxycodone 09/13/2023 Not Detected  Not Detected, Indeterminate Final    Cutoff for a negative oxycodone is 100 ng/mL or less.    Buprenorphine 09/13/2023 Not Detected  Not Detected, Indeterminate Final    Cutoff for a negative buprenorphine is 10 ng/ml or less.   Office Visit on 08/01/2023   Component Date Value Ref Range Status    HIV Antigen Antibody Combo 08/01/2023 Nonreactive  Nonreactive Final    HIV-1 p24 Ag & HIV-1/HIV-2 Ab Not Detected    Hepatitis C Antibody 08/01/2023 Nonreactive  Nonreactive Final    Treponema Antibody Total 08/01/2023 Nonreactive  Nonreactive Final    Sodium 08/01/2023 139  136 - 145 mmol/L Final    Potassium 08/01/2023 4.1  3.4 - 5.3 mmol/L Final    Chloride 08/01/2023 103  98 - 107 mmol/L Final    Carbon Dioxide (CO2) 08/01/2023 26  22 - 29 mmol/L Final    Anion Gap 08/01/2023 10  7 - 15 mmol/L Final    Urea Nitrogen 08/01/2023 12.6  6.0 - 20.0 mg/dL Final    Creatinine 08/01/2023 0.58  0.51 - 0.95 mg/dL Final    Calcium 08/01/2023 9.0  8.6 - 10.0 mg/dL Final    Glucose 08/01/2023 93  70 - 99 mg/dL Final    Alkaline Phosphatase 08/01/2023 78  35 - 104 U/L Final    AST 08/01/2023 19  0 - 45 U/L Final    Reference intervals for this test were updated on 6/12/2023 to more accurately reflect our healthy population. There may be differences in the flagging of prior results with similar values performed  with this method. Interpretation of those prior results can be made in the context of the updated reference intervals.    ALT 08/01/2023 11  0 - 50 U/L Final    Reference intervals for this test were updated on 6/12/2023 to more accurately reflect our healthy population. There may be differences in the flagging of prior results with similar values performed with this method. Interpretation of those prior results can be made in the context of the updated reference intervals.      Protein Total 08/01/2023 7.1  6.4 - 8.3 g/dL Final    Albumin 08/01/2023 4.4  3.5 - 5.2 g/dL Final    Bilirubin Total 08/01/2023 0.5  <=1.2 mg/dL Final    GFR Estimate 08/01/2023 >90  >60 mL/min/1.73m2 Final    Estradiol 08/01/2023 <5  pg/mL Final    Healthy Men:   11.3-43.2 pg/mL    Healthy Postmenopausal Women:  Postmenopause: <5-138 pg/mL    Healthy Pregnant Women:  1st trimester: 154-3243 pg/mL  2nd trimester: 1561-87411 pg/mL  3rd trimester: 8525->81645 pg/mL    Healthy Women Cycle Phase:  Follicular: 30.9-90.4 pg/mL  Ovulation: 60.4-533 pg/mL  Luteal: 60.4-232 pg/mL    Healthy Women Cycle Sub-Phase:  Early Follicular: 20.5-62.8 pg/mL  Intermediate Follicular: 26-79.8 pg/mL  Late Follicular: 49.5-233 pg/mL  Ovulation: 60.4-602 pg/mL  Early Luteal: 51.1-179 pg/mL  Intermediate Luteal: 66.5-305 pg/mL  Late Luteal: 30.2-222 pg/mL    WBC Count 08/01/2023 5.8  4.0 - 11.0 10e3/uL Final    RBC Count 08/01/2023 4.70  3.80 - 5.20 10e6/uL Final    Hemoglobin 08/01/2023 13.5  11.7 - 15.7 g/dL Final    Hematocrit 08/01/2023 39.7  35.0 - 47.0 % Final    MCV 08/01/2023 85  78 - 100 fL Final    MCH 08/01/2023 28.7  26.5 - 33.0 pg Final    MCHC 08/01/2023 34.0  31.5 - 36.5 g/dL Final    RDW 08/01/2023 13.2  10.0 - 15.0 % Final    Platelet Count 08/01/2023 225  150 - 450 10e3/uL Final    Cholesterol 08/01/2023 169  <200 mg/dL Final    Triglycerides 08/01/2023 89  <150 mg/dL Final    Direct Measure HDL 08/01/2023 62  >=50 mg/dL Final    LDL Cholesterol  Calculated 08/01/2023 89  <=100 mg/dL Final    Non HDL Cholesterol 08/01/2023 107  <130 mg/dL Final    Cannabinoids (28-xsz-2-carboxy-9-T* 08/01/2023 Not Detected  Not Detected, Indeterminate Final    Cutoff for a negative cannabinoid is 50 ng/mL or less.    Phencyclidine 08/01/2023 Not Detected  Not Detected, Indeterminate Final    Cutoff for a negative PCP is 25 ng/mL or less.    Cocaine (Benzoylecgonine) 08/01/2023 Not Detected  Not Detected, Indeterminate Final    Cutoff for a negative cocaine is 150 ng/ml or less.    Methamphetamine (d-Methamphetamine) 08/01/2023 Not Detected  Not Detected, Indeterminate Final    Cutoff for a negative methamphetamine is 500 ng/ml or less.    Opiates (Morphine) 08/01/2023 Detected (A)  Not Detected, Indeterminate Final    Cutoff for a positive opiate is greater than 100 ng/ml.  This is an unconfirmed screening result to be used for medical purposes only.     Amphetamine (d-Amphetamine) 08/01/2023 Not Detected  Not Detected, Indeterminate Final    Cutoff for a negative amphetamine is 500 ng/mL or less.    Benzodiazepines (Nordiazepam) 08/01/2023 Not Detected  Not Detected, Indeterminate Final    Cutoff for a negative benzodiazepine is 150 ng/ml or less.    Tricyclic Antidepressants (Desipra* 08/01/2023 Not Detected  Not Detected, Indeterminate Final    Cutoff for a negative tricyclic antidepressant is 300 ng/ml or less.    Methadone 08/01/2023 Not Detected  Not Detected, Indeterminate Final    Cutoff for a negative methadone is 200 ng/ml or less.    Barbiturates (Butalbital) 08/01/2023 Not Detected  Not Detected, Indeterminate Final    Cutoff for a negative barbituate is 200 ng/ml or less.    Oxycodone 08/01/2023 Not Detected  Not Detected, Indeterminate Final    Cutoff for a negative oxycodone is 100 ng/mL or less.    Propoxyphene (Norpropoxyphene) 08/01/2023 Not Detected  Not Detected, Indeterminate Final    Cutoff for a negative propoxyphene is 300 ng/ml or less.     Buprenorphine 08/01/2023 Not Detected  Not Detected, Indeterminate Final    Cutoff for a negative buprenorphine is 10 ng/ml or less.    Sex Hormone Binding Globulin 08/01/2023 24 (L)  30 - 135 nmol/L Final    Free Testosterone Calculated 08/01/2023 0.15  ng/dL Final    Adult Female Reference Range:  18-30 Years: 0.08-0.74 ng/dL  31-40 Years: 0.13-0.92 ng/dL  41-51 Years: 0.11-0.58 ng/dL  Postmenopausal: 0.06-0.38 ng/dL    Testosterone Total 08/01/2023 7 (L)  8 - 60 ng/dL Final    Chlamydia Trachomatis 08/01/2023 Negative  Negative Final    Negative for C. trachomatis rRNA by transcription mediated amplification.   A negative result by transcription mediated amplification does not preclude the presence of infection because results are dependent on proper and adequate collection, absence of inhibitors and sufficient rRNA to be detected.    Neisseria gonorrhoeae 08/01/2023 Negative  Negative Final    Negative for N. gonorrhoeae rRNA by transcription mediated amplification. A negative result by transcription mediated amplification does not preclude the presence of C. trachomatis infection because results are dependent on proper and adequate collection, absence of inhibitors and sufficient rRNA to be detected.     EKG: Most recent EKG from 2019 reviewed. QTc interval 423.          Medical Review of Systems      Pertinent positives noted in HPI and below:   Review of Systems   All other systems reviewed and are negative.       No LMP recorded. (Menstrual status: Reassignment).         Mental Status Exam      Appearance: awake, alert, adequately groomed, appeared stated age and no apparent distress  Attitude:  cooperative   Eye Contact:  good  Gait and Station: normal, no gross abnormalities noted by observation  Psychomotor Behavior:  no evidence of tardive dyskinesia, dystonia, or tics  Oriented to:  person, place, time, and situation  Attention Span and Concentration:  normal  Speech:  clear, coherent, regular rate,  "rhythm, and volume  Language: intact  Mood: \"Tired\"  Affect:  appropriate and in normal range  Associations:  no loose associations  Thought Process:  logical, linear and goal oriented  Thought Content:  no evidence of suicidal ideation or homicidal ideation, no evidence of psychotic thought, no auditory hallucinations present and no visual hallucinations present  Recent and Remote Memory:  Intact to interview. Not formally assessed. No amnesia.  Fund of Knowledge: appropriate  Insight: Partial to full  Judgment:  intact, adequate for safety  Impulse Control:  intact         Risk Assessment         Suicide assessment  Acute: Low  Chronic:Low  Imminent: Low     Risk factors  History of suicide attempts: Yes  History of self-injurious behavior: Yes  Terry. Axis I psychiatric diagnoses: Yes  Substance use disorder: No  Symptoms:  impulsivity  Family history of completed suicide or attempted suicide: Yes  Accessibility to firearms: No  Interpersonal factors: Financial stress, social stress, LGBQT+     Protective factors  Ability to cope with the stress: Yes  Family responsibility and supportive:Yes  Positive therapeutic relationships: No  Social support:Yes  Mormonism beliefs:  No  Connectedness with mental health providers: Yes, establishing today     Homicidal Risk  Acute: Low  Chronic: Low  Imminent: Low       Assessment     Cindi Armas reports monitoring symptoms of depression and anxiety.  We did discuss the possibility of engaging in IOP versus PHP versus DBT.  Discussed all levels of care.  Consideration to taking a leave of absence from work. Recommend re engaging in individual therapy.  Has found benefit and relationship with therapist, Majo.  Patient has only been taking sertraline 100 mg and not 200 mg of sertraline.  Discussed advancing to 150 mg and monitoring for response.  Could consider optimizing to 200 mg and/or optimizing with buspirone.  No imminent safety concerns " identified.    Pharmacologic:   7/1/2024 + Adderall XR 20 mg    -Adderall XR 20 mg by mouth every morning.    -Sertraline 100 mg tablet, take 1.5 tablets by mouth every day  -Trazodone 50mg at bedtime as needed, rare use    *Consider buspirone      Psychosocial: Would benefit from individual therapy with focus of Dialectical Behavior Therapy (DBT). DBT would be helpful in addressing emotional regulation, distress tolerance, mindfulness, and interpersonal effectiveness.    -Reschedule with individual therapist        Diagnosis       ADHD, combined presentation  ARIELLE with panic attacks  MDD, recurrent in partial to full remission  Cluster B traits  Gender dysphoria     PTSD by history  Borderline personality disorder by history    Rule out eating d/o        Plan         1) Medications:   -Adderall XR 20 mg by mouth every morning.    -Sertraline 100 mg tablet, take 1.5 tablets by mouth every day  -Trazodone 50mg at bedtime as needed, rare use              [place]NPMP: I have queried the [place] and/or [place] Prescription Monitoring Program for this patient for the preceding 12 months, or reviewed the report provided by my proxy delegate. I have not identified any concerns.  2) Risk vs benefits of medications reviewed: Yes  3) Life style modifications: sleep hygiene, exercise, healthy diet  4) Medical concerns:    -No acute concerns  5) Other:   -Continue individual therapy, needs to re schedule    6) Refrain from drinking alcohol and/or use of drugs.   7) Please secure all prescription and OTC medications, sharps, and caustic substances. Please remove all firearms and ammunition.  8) Review outside records, get GÓMEZ's, coordinate with outside providers  9) In case of emergency call 911 or go to the nearest ER, this includes patient voicing thought of harming self or others as well as additional safety concerns   10) Follow-up: 6 weeks, or sooner if needed.      Administrative Billing:   Supportive therapy was provided, focusing on  reflective listening and solution focused problem solving.    Total time preparing to see this patient, face-to-face time, documenting in the EHR, and coordinating care time on the same calendar date: 34 minutes    The longitudinal plan of care for the diagnosis(es)/condition(s) as documented were addressed during this visit. Due to the added complexity in care, I will continue to support Cindi in the subsequent management and with ongoing continuity of care.        Signed:   EUSEBIA Pichardo CNP on 11/26/2024 at 10:06 AM     Disclaimer: This note consists of symbols derived from keyboarding, dictation and/or voice recognition software. As a result, there may be errors in the script that have gone undetected. Please consider this when interpreting information found in this chart.

## 2024-11-25 NOTE — NURSING NOTE
Current patient location: [address]  [facility] [address]    Is the patient currently in the state of [place]? YES    Visit mode:VIDEO    If the visit is dropped, the patient can be reconnected by:VIDEO VISIT: Text to cell phone:   Telephone Information:   Mobile [phone]    and VIDEO VISIT: Send to e-mail at: [email or web address]    Will anyone else be joining the visit? NO  (If patient encounters technical issues they should call [phone]    Are changes needed to the allergy or medication list? No    Are refills needed on medications prescribed by this physician? NO    Rooming Documentation:  Questionnaire(s) completed    Reason for visit: RECHECK    Evelyn CORDOVA

## 2025-01-21 ENCOUNTER — MYC REFILL (OUTPATIENT)
Dept: PSYCHIATRY | Facility: CLINIC | Age: 25
End: 2025-01-21
Payer: COMMERCIAL

## 2025-01-21 DIAGNOSIS — F90.2 ATTENTION DEFICIT HYPERACTIVITY DISORDER (ADHD), COMBINED TYPE: ICD-10-CM

## 2025-01-21 NOTE — TELEPHONE ENCOUNTER
Date of Last Office Visit: 11/25/24  Date of Next Office Visit:  2/11/25  No shows since last visit: No  More than one patient-initiated cancellation (with reschedule) since last seen in clinic? No    []Medication refilled per  Medication Refill in Ambulatory Care  policy.  [x]Medication unable to be refilled by RN due to criteria not met as indicated below:    []Eligibility: has not had a provider visit within last 6 months   []Supervision: no future appointment; < 7 days before next appointment   []Compliance: no shows; cancellations; lapse in therapy   []Verification: order discrepancy; may need modification...   [] > 30-day supply request   []Advanced refill request: > 7 days before refill date   [x]Controlled medication   []Medication not included in policy   []Review: new med; med adjusted <= 30 days; safety alert; requires lab monitoring...   []Scope of Practice: refill request processed by LPN/MA   []Other:      Medication(s) requested:     -  amphetamine-dextroamphetamine (ADDERALL XR) 20 MG 24 hr capsule   Date last ordered: 1/7/25  Qty: 30  Refills: 0  Appropriate for refill? Yes, needs sent to a different pharmacy    Any Controlled Substance(s)? Yes   [place]  checked? N/A      Requested medication(s) verified as identical to current order? Yes    Any lapse in adherence to medication(s) greater than 5 days? No      Additional action taken? cued up medication/order(s).      Last visit treatment plan:   -Adderall XR 20 mg by mouth every morning.    -Sertraline 100 mg tablet, take 1.5 tablets by mouth every day  -Trazodone 50mg at bedtime as needed, rare use   Follow-up: 6 weeks, or sooner if needed     Any medication(s) require lab monitoring? No

## 2025-01-22 RX ORDER — DEXTROAMPHETAMINE SACCHARATE, AMPHETAMINE ASPARTATE MONOHYDRATE, DEXTROAMPHETAMINE SULFATE AND AMPHETAMINE SULFATE 5; 5; 5; 5 MG/1; MG/1; MG/1; MG/1
20 CAPSULE, EXTENDED RELEASE ORAL DAILY
Qty: 30 CAPSULE | Refills: 0 | Status: SHIPPED | OUTPATIENT
Start: 2025-01-22

## 2025-01-27 ENCOUNTER — VIRTUAL VISIT (OUTPATIENT)
Dept: PSYCHIATRY | Facility: CLINIC | Age: 25
End: 2025-01-27
Payer: COMMERCIAL

## 2025-01-27 DIAGNOSIS — F60.9 CLUSTER B PERSONALITY DISORDER IN ADULT (H): ICD-10-CM

## 2025-01-27 DIAGNOSIS — F90.2 ATTENTION DEFICIT HYPERACTIVITY DISORDER (ADHD), COMBINED TYPE: Primary | ICD-10-CM

## 2025-01-27 DIAGNOSIS — F41.1 GENERALIZED ANXIETY DISORDER: ICD-10-CM

## 2025-01-27 DIAGNOSIS — F33.42 MAJOR DEPRESSIVE DISORDER, RECURRENT EPISODE, IN FULL REMISSION: ICD-10-CM

## 2025-01-27 PROCEDURE — G2211 COMPLEX E/M VISIT ADD ON: HCPCS | Mod: 95 | Performed by: NURSE PRACTITIONER

## 2025-01-27 PROCEDURE — 98006 SYNCH AUDIO-VIDEO EST MOD 30: CPT | Performed by: NURSE PRACTITIONER

## 2025-01-27 RX ORDER — SERTRALINE HYDROCHLORIDE 100 MG/1
150 TABLET, FILM COATED ORAL DAILY
Qty: 135 TABLET | Refills: 1 | Status: SHIPPED | OUTPATIENT
Start: 2025-01-27

## 2025-01-27 RX ORDER — LISDEXAMFETAMINE DIMESYLATE 30 MG/1
30 CAPSULE ORAL DAILY
Qty: 30 CAPSULE | Refills: 0 | Status: SHIPPED | OUTPATIENT
Start: 2025-01-27 | End: 2025-02-26

## 2025-01-27 RX ORDER — LISDEXAMFETAMINE DIMESYLATE 30 MG/1
30 CAPSULE ORAL DAILY
Qty: 30 CAPSULE | Refills: 0 | Status: SHIPPED | OUTPATIENT
Start: 2025-03-28 | End: 2025-04-27

## 2025-01-27 RX ORDER — LISDEXAMFETAMINE DIMESYLATE 30 MG/1
30 CAPSULE ORAL DAILY
Qty: 30 CAPSULE | Refills: 0 | Status: SHIPPED | OUTPATIENT
Start: 2025-02-26 | End: 2025-03-28

## 2025-01-27 RX ORDER — TRAZODONE HYDROCHLORIDE 50 MG/1
100 TABLET, FILM COATED ORAL AT BEDTIME
Qty: 30 TABLET | Refills: 2 | Status: SHIPPED | OUTPATIENT
Start: 2025-01-27

## 2025-01-27 ASSESSMENT — PAIN SCALES - GENERAL: PAINLEVEL_OUTOF10: NO PAIN (0)

## 2025-01-27 NOTE — NURSING NOTE
Is the patient currently in the state of [place]? YES    Current patient location: Patient declined to provide     Visit mode:Video    If the visit is dropped, the patient can be reconnected by: VIDEO VISIT: Text to cell phone:   Telephone Information:   Mobile [phone]       Will anyone else be joining the visit? No  (If patient encounters technical issues they should call [phone])    Are changes needed to the allergy or medication list? No    Are refills needed on medications prescribed by this physician? No    Rooming Documentation: Questionnaire(s) completed.    Reason for visit: RECHART Jason

## 2025-01-27 NOTE — PROGRESS NOTES
Virtual Visit Details    Type of service:  Video Visit     Originating Location (pt. Location): [place], car    Distant Location (provider location):  Off-site  Platform used for Video Visit: [facility]     OUTPATIENT PSYCHIATRIC FOLLOW-UP      2025     Provider: EUSEBIA Pichardo CNP      Appointment Start Time: 2:59 PM  Appointment End Time: 3:22 PM    Name: Cindi Armas   : 2000                    Preferred Name: Cindi      Screening Tools           2024     1:17 PM 2024     2:34 PM 2024     2:15 PM   PHQ   PHQ-9 Total Score 9  22  15   Q9: Thoughts of better off dead/self-harm past 2 weeks Not at all Not at all Not at all       Patient-reported         2024     2:35 PM 2024     2:16 PM 2023     9:52 AM   ARIELLE-7 SCORE   Total Score 18 (severe anxiety) 16 (severe anxiety) 18 (severe anxiety)   Total Score 18  16 18       Patient-reported     The following assessments were completed by patient for this visit:  PROMIS 10-Global Health (all questions and answers displayed):       2023     7:57 AM 2023    10:55 AM 2024     2:18 PM 2024     7:58 AM 2024     8:13 AM 2024     2:37 PM 2025     2:52 PM   PROMIS 10   In general, would you say your health is: Good Good Fair Good Very good Good Very good   In general, would you say your quality of life is: Very good Excellent Fair Good Very good Fair Good   In general, how would you rate your physical health? Very good Excellent Fair Excellent Very good Good Very good   In general, how would you rate your mental health, including your mood and your ability to think? Fair Excellent Fair Good Very good Fair Excellent   In general, how would you rate your satisfaction with your social activities and relationships? Very good Excellent Good Fair Fair Poor Very good   In general, please rate how well you carry out your usual social activities and roles Good Excellent Fair Fair Good Poor Very good   To what  extent are you able to carry out your everyday physical activities such as walking, climbing stairs, carrying groceries, or moving a chair? Moderately Completely Moderately Completely Completely Completely Mostly   In the past 7 days, how often have you been bothered by emotional problems such as feeling anxious, depressed, or irritable? Sometimes Never Sometimes Sometimes Rarely Often Rarely   In the past 7 days, how would you rate your fatigue on average? Mild None Moderate Moderate Moderate Severe Moderate   In the past 7 days, how would you rate your pain on average, where 0 means no pain, and 10 means worst imaginable pain? 0 0 0 0 0 0 0   In general, would you say your health is: 3 3 2 3 4 3 4   In general, would you say your quality of life is: 4 5 2 3 4 2 3   In general, how would you rate your physical health? 4 5 2 5 4 3 4   In general, how would you rate your mental health, including your mood and your ability to think? 2 5 2 3 4 2 5   In general, how would you rate your satisfaction with your social activities and relationships? 4 5 3 2 2 1 4   In general, please rate how well you carry out your usual social activities and roles. (This includes activities at home, at work and in your community, and responsibilities as a parent, child, spouse, employee, friend, etc.) 3 5 2 2 3 1 4   To what extent are you able to carry out your everyday physical activities such as walking, climbing stairs, carrying groceries, or moving a chair? 3 5 3 5 5 5 4   In the past 7 days, how often have you been bothered by emotional problems such as feeling anxious, depressed, or irritable? 3 1 3 3 2 4 2   In the past 7 days, how would you rate your fatigue on average? 2 1 3 3 3 4 3   In the past 7 days, how would you rate your pain on average, where 0 means no pain, and 10 means worst imaginable pain? 0 0 0 0 0 0 0   Global Mental Health Score 13 20 10 11 14 7  16    Global Physical Health Score 16 20 13 18 17 15  16    [facility]  "TOTAL - SUBSCORES 29 40 23 29 31 22  32        Patient-reported          History of Present Illness      Patient attended the session alone.     Interim History:  I last saw Cindi Armas for outpatient psychiatry follow-up on 11/25/24. During that appointment, we continued plan    Current stressors include: Physical sx.     Coping mechanisms and supports include: Hobbies    Side effects: Jittery, anxious with Adderall dose initially in AM    Medication adherence: Reports good med adherence.     Psychiatric Review of Systems      Cindi Armas reports mood has been: \"emotionally better\".     - Work: Better. Some trips in [place]. Sent email to HR / managers. Bid in March to get back to [facility].   - Grandma is stable with her health. Mom leaving to [place].   - Was debating medical leave but is going to hold off.   - Continues to follow-up regarding fistula     Depression has been:   - Some days lack of motivation, still was improved.   - Improved overall since November  - Appetite: Had trigger > binge and purged x1 since last appt. Relationship with food fluctuates.     Anxiety has been:  - More manageable    Sleep has been:   - Sleep stable. \"Pretty good\". Opting for work trips to allow more sleep.   - Improved sleep schedule helpful for mood    Allison sxs: Denies    Psychosis sxs: Denies    ADHD sxs:   - Stable    PTSD sxs: Denies    SI/SIB: Denies SI, SIB, HI. Finds she is future focused. Thinking of March schedule, planning to trip to [place] (Feb).      Medications Prior to Appointment       Current Outpatient Medications   Medication Sig Dispense Refill    amphetamine-dextroamphetamine (ADDERALL XR) 20 MG 24 hr capsule Take 1 capsule (20 mg) by mouth daily. 30 capsule 0    amphetamine-dextroamphetamine (ADDERALL XR) 20 MG 24 hr capsule Take 1 capsule (20 mg) by mouth daily. 30 capsule 0    estradiol (ESTRACE) 1 MG tablet Take 1 tablet (1 mg) by mouth daily 90 tablet 1    sertraline (ZOLOFT) 100 MG tablet Take 1.5 " tablets (150 mg) by mouth daily. 135 tablet 1    traZODone (DESYREL) 50 MG tablet Take 100 mg by mouth at bedtime          Previous medication trials include but not limited to:  - Abilify   - Adderall XR  - Concerta  - Strattera, ineffective  - minipress  - trazodone  - sertraline  - bupropion, increased anxiety                    Medication Compliance: No                 Pharmacogenomic Testing Completed: No      Medical History      History of head injuries: No  History of seizures: No  History of cardiac events: No  History of Tardive Dyskinesia: No     - HRT, 2012/2013  - Vaginoplasty, 2018  - Repair of rectovaginal fistula, 04/2024 (w / ileostomy creation)      No past medical history on file.     Surgery: No past surgical history on file.  Primary Care Provider: Physician No Ref-Primary        Social History      Current Living situation:  [place], [place] with Roommate.    Current use of drugs or alcohol: Denies     Tobacco use: No    Employment: Yes:      Relationship Status: single     Vitals      Not obtained d/t virtual visit.     There were no vitals taken for this visit.    Labs        Most recent laboratory results reviewed and pertinent results include:   Lab on 09/13/2023   Component Date Value Ref Range Status    Cannabinoids (04-xpt-5-carboxy-9-T* 09/13/2023 Not Detected  Not Detected, Indeterminate Final    Cutoff for a negative cannabinoid is 50 ng/mL or less.    Phencyclidine 09/13/2023 Not Detected  Not Detected, Indeterminate Final    Cutoff for a negative PCP is 25 ng/mL or less.    Cocaine (Benzoylecgonine) 09/13/2023 Not Detected  Not Detected, Indeterminate Final    Cutoff for a negative cocaine is 150 ng/ml or less.    Methamphetamine (d-Methamphetamine) 09/13/2023 Not Detected  Not Detected, Indeterminate Final    Cutoff for a negative methamphetamine is 500 ng/ml or less.    Opiates (Morphine) 09/13/2023 Not Detected  Not Detected, Indeterminate Final    Cutoff for a  negative opiate is 100 ng/ml or less.    Amphetamine (d-Amphetamine) 09/13/2023 Detected (A)  Not Detected, Indeterminate Final    Cutoff for a positive amphetamine is greater than 500 ng/ml.  This is an unconfirmed screening result to be used for medical purposes only.     Benzodiazepines (Nordiazepam) 09/13/2023 Not Detected  Not Detected, Indeterminate Final    Cutoff for a negative benzodiazepine is 150 ng/ml or less.    Tricyclic Antidepressants (Desipra* 09/13/2023 Not Detected  Not Detected, Indeterminate Final    Cutoff for a negative tricyclic antidepressant is 300 ng/ml or less.    Methadone 09/13/2023 Not Detected  Not Detected, Indeterminate Final    Cutoff for a negative methadone is 200 ng/ml or less.    Barbiturates (Butalbital) 09/13/2023 Not Detected  Not Detected, Indeterminate Final    Cutoff for a negative barbituate is 200 ng/ml or less.    Oxycodone 09/13/2023 Not Detected  Not Detected, Indeterminate Final    Cutoff for a negative oxycodone is 100 ng/mL or less.    Buprenorphine 09/13/2023 Not Detected  Not Detected, Indeterminate Final    Cutoff for a negative buprenorphine is 10 ng/ml or less.   Office Visit on 08/01/2023   Component Date Value Ref Range Status    HIV Antigen Antibody Combo 08/01/2023 Nonreactive  Nonreactive Final    HIV-1 p24 Ag & HIV-1/HIV-2 Ab Not Detected    Hepatitis C Antibody 08/01/2023 Nonreactive  Nonreactive Final    Treponema Antibody Total 08/01/2023 Nonreactive  Nonreactive Final    Sodium 08/01/2023 139  136 - 145 mmol/L Final    Potassium 08/01/2023 4.1  3.4 - 5.3 mmol/L Final    Chloride 08/01/2023 103  98 - 107 mmol/L Final    Carbon Dioxide (CO2) 08/01/2023 26  22 - 29 mmol/L Final    Anion Gap 08/01/2023 10  7 - 15 mmol/L Final    Urea Nitrogen 08/01/2023 12.6  6.0 - 20.0 mg/dL Final    Creatinine 08/01/2023 0.58  0.51 - 0.95 mg/dL Final    Calcium 08/01/2023 9.0  8.6 - 10.0 mg/dL Final    Glucose 08/01/2023 93  70 - 99 mg/dL Final    Alkaline Phosphatase  08/01/2023 78  35 - 104 U/L Final    AST 08/01/2023 19  0 - 45 U/L Final    Reference intervals for this test were updated on 6/12/2023 to more accurately reflect our healthy population. There may be differences in the flagging of prior results with similar values performed with this method. Interpretation of those prior results can be made in the context of the updated reference intervals.    ALT 08/01/2023 11  0 - 50 U/L Final    Reference intervals for this test were updated on 6/12/2023 to more accurately reflect our healthy population. There may be differences in the flagging of prior results with similar values performed with this method. Interpretation of those prior results can be made in the context of the updated reference intervals.      Protein Total 08/01/2023 7.1  6.4 - 8.3 g/dL Final    Albumin 08/01/2023 4.4  3.5 - 5.2 g/dL Final    Bilirubin Total 08/01/2023 0.5  <=1.2 mg/dL Final    GFR Estimate 08/01/2023 >90  >60 mL/min/1.73m2 Final    Estradiol 08/01/2023 <5  pg/mL Final    Healthy Men:   11.3-43.2 pg/mL    Healthy Postmenopausal Women:  Postmenopause: <5-138 pg/mL    Healthy Pregnant Women:  1st trimester: 154-3243 pg/mL  2nd trimester: 1561-64904 pg/mL  3rd trimester: 8525->17987 pg/mL    Healthy Women Cycle Phase:  Follicular: 30.9-90.4 pg/mL  Ovulation: 60.4-533 pg/mL  Luteal: 60.4-232 pg/mL    Healthy Women Cycle Sub-Phase:  Early Follicular: 20.5-62.8 pg/mL  Intermediate Follicular: 26-79.8 pg/mL  Late Follicular: 49.5-233 pg/mL  Ovulation: 60.4-602 pg/mL  Early Luteal: 51.1-179 pg/mL  Intermediate Luteal: 66.5-305 pg/mL  Late Luteal: 30.2-222 pg/mL    WBC Count 08/01/2023 5.8  4.0 - 11.0 10e3/uL Final    RBC Count 08/01/2023 4.70  3.80 - 5.20 10e6/uL Final    Hemoglobin 08/01/2023 13.5  11.7 - 15.7 g/dL Final    Hematocrit 08/01/2023 39.7  35.0 - 47.0 % Final    MCV 08/01/2023 85  78 - 100 fL Final    MCH 08/01/2023 28.7  26.5 - 33.0 pg Final    MCHC 08/01/2023 34.0  31.5 - 36.5 g/dL  Final    RDW 08/01/2023 13.2  10.0 - 15.0 % Final    Platelet Count 08/01/2023 225  150 - 450 10e3/uL Final    Cholesterol 08/01/2023 169  <200 mg/dL Final    Triglycerides 08/01/2023 89  <150 mg/dL Final    Direct Measure HDL 08/01/2023 62  >=50 mg/dL Final    LDL Cholesterol Calculated 08/01/2023 89  <=100 mg/dL Final    Non HDL Cholesterol 08/01/2023 107  <130 mg/dL Final    Cannabinoids (39-lrb-2-carboxy-9-T* 08/01/2023 Not Detected  Not Detected, Indeterminate Final    Cutoff for a negative cannabinoid is 50 ng/mL or less.    Phencyclidine 08/01/2023 Not Detected  Not Detected, Indeterminate Final    Cutoff for a negative PCP is 25 ng/mL or less.    Cocaine (Benzoylecgonine) 08/01/2023 Not Detected  Not Detected, Indeterminate Final    Cutoff for a negative cocaine is 150 ng/ml or less.    Methamphetamine (d-Methamphetamine) 08/01/2023 Not Detected  Not Detected, Indeterminate Final    Cutoff for a negative methamphetamine is 500 ng/ml or less.    Opiates (Morphine) 08/01/2023 Detected (A)  Not Detected, Indeterminate Final    Cutoff for a positive opiate is greater than 100 ng/ml.  This is an unconfirmed screening result to be used for medical purposes only.     Amphetamine (d-Amphetamine) 08/01/2023 Not Detected  Not Detected, Indeterminate Final    Cutoff for a negative amphetamine is 500 ng/mL or less.    Benzodiazepines (Nordiazepam) 08/01/2023 Not Detected  Not Detected, Indeterminate Final    Cutoff for a negative benzodiazepine is 150 ng/ml or less.    Tricyclic Antidepressants (Desipra* 08/01/2023 Not Detected  Not Detected, Indeterminate Final    Cutoff for a negative tricyclic antidepressant is 300 ng/ml or less.    Methadone 08/01/2023 Not Detected  Not Detected, Indeterminate Final    Cutoff for a negative methadone is 200 ng/ml or less.    Barbiturates (Butalbital) 08/01/2023 Not Detected  Not Detected, Indeterminate Final    Cutoff for a negative barbituate is 200 ng/ml or less.    Oxycodone  08/01/2023 Not Detected  Not Detected, Indeterminate Final    Cutoff for a negative oxycodone is 100 ng/mL or less.    Propoxyphene (Norpropoxyphene) 08/01/2023 Not Detected  Not Detected, Indeterminate Final    Cutoff for a negative propoxyphene is 300 ng/ml or less.    Buprenorphine 08/01/2023 Not Detected  Not Detected, Indeterminate Final    Cutoff for a negative buprenorphine is 10 ng/ml or less.    Sex Hormone Binding Globulin 08/01/2023 24 (L)  30 - 135 nmol/L Final    Free Testosterone Calculated 08/01/2023 0.15  ng/dL Final    Adult Female Reference Range:  18-30 Years: 0.08-0.74 ng/dL  31-40 Years: 0.13-0.92 ng/dL  41-51 Years: 0.11-0.58 ng/dL  Postmenopausal: 0.06-0.38 ng/dL    Testosterone Total 08/01/2023 7 (L)  8 - 60 ng/dL Final    Chlamydia Trachomatis 08/01/2023 Negative  Negative Final    Negative for C. trachomatis rRNA by transcription mediated amplification.   A negative result by transcription mediated amplification does not preclude the presence of infection because results are dependent on proper and adequate collection, absence of inhibitors and sufficient rRNA to be detected.    Neisseria gonorrhoeae 08/01/2023 Negative  Negative Final    Negative for N. gonorrhoeae rRNA by transcription mediated amplification. A negative result by transcription mediated amplification does not preclude the presence of C. trachomatis infection because results are dependent on proper and adequate collection, absence of inhibitors and sufficient rRNA to be detected.     EKG: Most recent EKG from 2019 reviewed. QTc interval 423.          Medical Review of Systems      Pertinent positives noted in HPI and below:   Review of Systems   All other systems reviewed and are negative.       No LMP recorded. (Menstrual status: Reassignment).         Mental Status Exam      Appearance: awake, alert, adequately groomed, appeared stated age and no apparent distress  Attitude:  cooperative   Eye Contact:  good  Gait and  "Station: normal, no gross abnormalities noted by observation  Psychomotor Behavior:  no evidence of tardive dyskinesia, dystonia, or tics  Oriented to:  person, place, time, and situation  Attention Span and Concentration:  normal  Speech:  clear, coherent, regular rate, rhythm, and volume  Language: intact  Mood: \"better\"  Affect:  appropriate and in normal range  Associations:  no loose associations  Thought Process:  logical, linear and goal oriented  Thought Content:  no evidence of suicidal ideation or homicidal ideation, no evidence of psychotic thought, no auditory hallucinations present and no visual hallucinations present  Recent and Remote Memory:  Intact to interview. Not formally assessed. No amnesia.  Fund of Knowledge: appropriate  Insight: Partial to full  Judgment:  intact, adequate for safety  Impulse Control:  intact         Risk Assessment         Suicide assessment  Acute: Low  Chronic:Low  Imminent: Low     Risk factors  History of suicide attempts: Yes  History of self-injurious behavior: Yes  Terry. Axis I psychiatric diagnoses: Yes  Substance use disorder: No  Symptoms:  impulsivity  Family history of completed suicide or attempted suicide: Yes  Accessibility to firearms: No  Interpersonal factors: Financial stress, social stress, LGBQT+     Protective factors  Ability to cope with the stress: Yes  Family responsibility and supportive:Yes  Positive therapeutic relationships: No  Social support:Yes  Adventism beliefs:  No  Connectedness with mental health providers: Yes, establishing today     Homicidal Risk  Acute: Low  Chronic: Low  Imminent: Low       Assessment     Cindi Armas reports improvement of depression and anxiety with sertraline advance to 150 mg.  Tolerating well.  1 episode of purging which we discussed and risks with being on stimulant.  Continues to have difficult relationship with food which she plans to explore in therapy.  Some binge episodes.  Given some less desired " effects of Adderall will trial Vyvanse to see if increased anxiety, jitteriness that is initially noticed in the morning when taking medication improves on an alternative agent.  We also discussed use of FMLA.  Discussed intermittent leave for flareups up to twice monthly for a full day.  Patient to consider and send paperwork via [employer]    Pharmacologic:   7/1/2024 + Adderall XR 20 mg    - STOP Adderall XR 20 mg by mouth every morning.    - START Vyvanse 30mg by mouth every morning  - Sertraline 100 mg tablet, take 1.5 tablets by mouth every day  - Trazodone 50mg at bedtime as needed, rare use    *Consider buspirone      Psychosocial: Would benefit from individual therapy with focus of Dialectical Behavior Therapy (DBT). DBT would be helpful in addressing emotional regulation, distress tolerance, mindfulness, and interpersonal effectiveness.    -Reschedule with individual therapist        Diagnosis       ADHD, combined presentation  ARIELLE with panic attacks  MDD, recurrent in partial to full remission  Cluster B traits  Gender dysphoria     PTSD by history  Borderline personality disorder by history    Rule out eating d/o        Plan         1) Medications:   - STOP Adderall XR 20 mg by mouth every morning.    - START Vyvanse 30mg by mouth every morning  - Sertraline 100 mg tablet, take 1.5 tablets by mouth every day  - Trazodone 50mg at bedtime as needed, rare use              MNPMP: I have queried the [place] and/or [place] Prescription Monitoring Program for this patient for the preceding 12 months, or reviewed the report provided by my proxy delegate. I have not identified any concerns.  2) Risk vs benefits of medications reviewed: Yes  3) Life style modifications: sleep hygiene, exercise, healthy diet  4) Medical concerns:    -No acute concerns  5) Other:   -Continue individual therapy, needs to re schedule with A Joann. Provided phone number to call.    6) Refrain from drinking alcohol and/or use of drugs.   7) Please  secure all prescription and OTC medications, sharps, and caustic substances. Please remove all firearms and ammunition.  8) Review outside records, get GÓMEZ's, coordinate with outside providers  9) In case of emergency call 911 or go to the nearest ER, this includes patient voicing thought of harming self or others as well as additional safety concerns   10) Follow-up: 6 weeks, or sooner if needed.      Administrative Billing:   Supportive therapy was provided, focusing on reflective listening and solution focused problem solving.    Total time preparing to see this patient, face-to-face time, documenting in the EHR, and coordinating care time on the same calendar date: 26 minutes    The longitudinal plan of care for the diagnosis(es)/condition(s) as documented were addressed during this visit. Due to the added complexity in care, I will continue to support Cindi in the subsequent management and with ongoing continuity of care.        Signed:   EUSEBIA Pichardo CNP on 1/27/2025 at 3:25 PM     Disclaimer: This note consists of symbols derived from keyboarding, dictation and/or voice recognition software. As a result, there may be errors in the script that have gone undetected. Please consider this when interpreting information found in this chart.

## 2025-03-24 ENCOUNTER — TELEPHONE (OUTPATIENT)
Dept: FAMILY MEDICINE | Facility: CLINIC | Age: 25
End: 2025-03-24
Payer: COMMERCIAL

## 2025-03-24 NOTE — TELEPHONE ENCOUNTER
PT wishes to start HRT with [facility] - has questions about timeframe for scheduling and getting rx.     PT reports that they were with a different provider who prescribed HRT, and provider wanted them to see a specialist.     PT has been off HRT for three weeks.     FD will call PT back to begin intake.

## 2025-03-24 NOTE — TELEPHONE ENCOUNTER
Pt is transferring [facility] care to [facility] due to previous provider at [facility] leaving. Pt has been on feminizing hormones since age 11 or 12. Pt reports that the last time she was seen with previous provider or had labs done was Summer of 2024. Pt is scheduled for appt with  04/07 and was emailed paperwork and GÓMEZ to complete and return to clinic ASAP.    Note that pt has currently already been off of hormones for about 3 weeks.    Alex Tom on 3/24/2025 at 1:24 PM

## 2025-03-28 ENCOUNTER — MYC MEDICAL ADVICE (OUTPATIENT)
Dept: PSYCHIATRY | Facility: CLINIC | Age: 25
End: 2025-03-28
Payer: COMMERCIAL

## 2025-04-02 ENCOUNTER — VIRTUAL VISIT (OUTPATIENT)
Dept: PSYCHOLOGY | Facility: CLINIC | Age: 25
End: 2025-04-02
Payer: COMMERCIAL

## 2025-04-02 DIAGNOSIS — F43.10 PTSD (POST-TRAUMATIC STRESS DISORDER): Primary | ICD-10-CM

## 2025-04-02 PROCEDURE — 90837 PSYTX W PT 60 MINUTES: CPT | Mod: 95 | Performed by: COUNSELOR
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
## 2025-04-07 ENCOUNTER — OFFICE VISIT (OUTPATIENT)
Dept: FAMILY MEDICINE | Facility: CLINIC | Age: 25
End: 2025-04-07
Payer: COMMERCIAL

## 2025-04-07 VITALS
DIASTOLIC BLOOD PRESSURE: 78 MMHG | SYSTOLIC BLOOD PRESSURE: 126 MMHG | HEIGHT: 68 IN | HEART RATE: 108 BPM | BODY MASS INDEX: 25.85 KG/M2

## 2025-04-07 DIAGNOSIS — F50.20 BULIMIA NERVOSA, UNSPECIFIED SEVERITY: ICD-10-CM

## 2025-04-07 DIAGNOSIS — N82.3 RECTOVAGINAL FISTULA: ICD-10-CM

## 2025-04-07 DIAGNOSIS — F41.1 GAD (GENERALIZED ANXIETY DISORDER): ICD-10-CM

## 2025-04-07 DIAGNOSIS — Z87.890 STATUS POST GENDER REASSIGNMENT SURGERY: Primary | ICD-10-CM

## 2025-04-07 DIAGNOSIS — Z75.8 DOES NOT HAVE PRIMARY CARE PROVIDER: ICD-10-CM

## 2025-04-07 PROBLEM — F33.9 MAJOR DEPRESSIVE DISORDER, RECURRENT: Status: ACTIVE | Noted: 2021-01-13

## 2025-04-07 PROBLEM — F90.2 ATTENTION DEFICIT HYPERACTIVITY DISORDER (ADHD), COMBINED TYPE: Status: ACTIVE | Noted: 2019-10-04

## 2025-04-07 PROCEDURE — 99205 OFFICE O/P NEW HI 60 MIN: CPT | Performed by: FAMILY MEDICINE

## 2025-04-07 PROCEDURE — 3074F SYST BP LT 130 MM HG: CPT | Performed by: FAMILY MEDICINE

## 2025-04-07 PROCEDURE — 3078F DIAST BP <80 MM HG: CPT | Performed by: FAMILY MEDICINE

## 2025-04-07 RX ORDER — ESTRADIOL 2 MG/1
3 TABLET ORAL DAILY
Qty: 135 TABLET | Refills: 1 | Status: SHIPPED | OUTPATIENT
Start: 2025-04-07

## 2025-04-07 NOTE — PROGRESS NOTES
Initial evaluation type: Transfer of Care for [facility]         HPI   ID: 24 year old  affirmed woman, uses she/her pronouns     present at visit: Medical student    CC: Here for Initial evaluation type: Transfer of Care for [facility] with estrogen    Able to Access Records From [facility] in [place], [place]    HPI:  Cindi began injectable puberty suppression around in 2012, age age 11, with at [facility], using injectable Lupron 30 mg every 3 months. . Ashish stage 4 per documentation 3/26/2012.  She began addition of estradiol 0.5 mg daily on 5/3/2013, later increased to 1 mg daily. Cindi underwent vaginoplasty in 2018, and noted to be on 1 mg estradiol bid. Later that year, she moved to [place] and transferred care to [facility], and continued on 1 mg/day dosing. Until 3/1/2024, when she began seeing Dr. Person there. Dose was increased to 2 mg/day, and then total of 4 mg (using 1 mg tablets)/day, spaced out 2 mg bid. Her last estradiol level was 5/31/2024  25.1 pg/mL, but unclear what dose she was taking at this time. Currently, she is just taking 2 mg total daily, and tends to forget her other 2 mg dose. Of note, she is taking estradiol sublingually. She feels mood shifts, acne, when does take 4 mg daily, but has some night sweats on 2 mg daily.    As noted above, had vaginoplasty in 2018, but developed vaginal stenosis due to lack of dilating related to depression.  She was evaluated by Dr. Garcia's clinic in [place] for possible revision vaginoplasty in 1/2024, and was recommended to start pelvic PT and home dilation program. She developed a rectovaginal fistula related to dilating, and undwerwent ileostomy and fistula repair in 4/2024. She continues to follow with Dr. Garcia and colorectal surgery in [place] as fistula heals. She also would like FFS and body contouring surgery, but could not get on wait list at [facility]. She is satisfied with breast development.      Current medical conditions:  Patient Active Problem List    Diagnosis    Attention deficit hyperactivity disorder (ADHD), combined type    Major depressive disorder, recurrent    Rectovaginal fistula    History of creation of ostomy (H)    Post traumatic stress disorder (PTSD)    Self-injurious behavior    Bulimia    ARIELLE (generalized anxiety disorder)    Bulimia is intermittent, last active 4-5 months ago  Has psychiatrist, but no PCP       No Known Allergies     Current medications:  Current Outpatient Medications   Medication Sig Dispense Refill    estradiol (ESTRACE) 1 MG tablet Take 1 tablet (1 mg) by mouth daily 90 tablet 1    lisdexamfetamine (VYVANSE) 40 MG capsule Take 1 capsule (40 mg) by mouth daily. 30 capsule 0    [START ON 4/27/2025] lisdexamfetamine (VYVANSE) 40 MG capsule Take 1 capsule (40 mg) by mouth daily. 30 capsule 0    [START ON 5/27/2025] lisdexamfetamine (VYVANSE) 40 MG capsule Take 1 capsule (40 mg) by mouth daily. 30 capsule 0    sertraline (ZOLOFT) 100 MG tablet Take 1.5 tablets (150 mg) by mouth daily. 135 tablet 1    traZODone (DESYREL) 50 MG tablet Take 2 tablets (100 mg) by mouth at bedtime. 30 tablet 2     No current facility-administered medications for this visit.        Past Medical History:  Vaginoplasty 2018  Rectovaginal fistula repair, ileostomy 2024  Mental health hospitalizations 2019, 2021 x 2      Family History:  Mother--type 2 DM, leukemia in remission  Father--CAD, d. MI (71)  Sibs: obesity, DM?  4 type 2 DM (aunts)  Type 1 DM (uncle)  Aunt--htn  Uncle--liver disease  Uncle--liver cancer  Aunt--pancreatic ca  Aunt--ovarian ca  Uncle--pancreatic ca      Social History:  Standard diet +dairy  Activity: run summer, swim, gym  Ex smoker--quit 2021  Alchol--1/2 weeks, 2/sitting  No substances  Work--fllight attendant  No significant other  No children      Sexual History:  Currently sexually active: not at present  Partners outside primary relationship: N/A     History STI's: none  Tested for HIV: yes negative  HPV vaccine status:  "vaccinated  PreP: No    ROS  12 point ROS negative except where noted above      Physical Exam  EXAM:  Blood pressure 126/78, pulse 108, height 1.727 m (5' 8\"), not currently breastfeeding.  Body mass index is 25.85 kg/m .    Constitutional: healthy, alert, and no distress   Cardiovascular: negative, PMI normal. No lifts, heaves, or thrills. RRR. No murmurs, clicks gallops or rub  Respiratory: negative, Percussion normal. Good diaphragmatic excursion. Lungs clear  Psychiatric: mentation appears normal and affect normal/bright   Ext--NT no edema      A/P  Hormone replacement s/p gender affirmation surgery  MDD  PTSD  Rectovaginal fistula s/p repair  Needs PCP  Counseled patient that can take estradiol in one daily dose to help with consistently taking. Counseled regarding no benefit to SL administration, but may result in irregular or high serum peaks. Goal is estradiol levels in 100 's ng/ML range. Discussed dose with patient--Start 3 mg (2 mg tab, 1 1/2 tab daily) orally.   Lab: estradiol in 1 month  To continue with mental health supports; if bulemia, consider change to TD estradiol  Continue with Dr. Garcai for fistula and vaginal repair  Discussed need to establish care with PCP, --recommend [facility], as has been before     Follow-up 3 months      "

## 2025-04-15 PROBLEM — F43.10 POST TRAUMATIC STRESS DISORDER (PTSD): Status: ACTIVE | Noted: 2021-03-24

## 2025-04-15 PROBLEM — F41.1 GAD (GENERALIZED ANXIETY DISORDER): Status: ACTIVE | Noted: 2025-04-15

## 2025-04-15 PROBLEM — Z72.89 SELF-INJURIOUS BEHAVIOR: Status: ACTIVE | Noted: 2018-11-24

## 2025-04-15 PROBLEM — F50.20 BULIMIA: Status: ACTIVE | Noted: 2025-04-15

## 2025-04-15 PROBLEM — Z93.9 HISTORY OF CREATION OF OSTOMY (H): Status: ACTIVE | Noted: 2024-04-04

## 2025-06-19 ENCOUNTER — OFFICE VISIT (OUTPATIENT)
Dept: FAMILY MEDICINE | Facility: CLINIC | Age: 25
End: 2025-06-19
Payer: COMMERCIAL

## 2025-06-19 VITALS
HEART RATE: 88 BPM | RESPIRATION RATE: 16 BRPM | DIASTOLIC BLOOD PRESSURE: 75 MMHG | WEIGHT: 169.7 LBS | OXYGEN SATURATION: 98 % | SYSTOLIC BLOOD PRESSURE: 116 MMHG | TEMPERATURE: 98.8 F | BODY MASS INDEX: 25.72 KG/M2 | HEIGHT: 68 IN

## 2025-06-19 DIAGNOSIS — R30.0 DYSURIA: Primary | ICD-10-CM

## 2025-06-19 LAB
ALBUMIN UR-MCNC: NEGATIVE MG/DL
APPEARANCE UR: CLEAR
BILIRUB UR QL STRIP: NEGATIVE
COLOR UR AUTO: YELLOW
GLUCOSE UR STRIP-MCNC: NEGATIVE MG/DL
HGB UR QL STRIP: NEGATIVE
KETONES UR STRIP-MCNC: NEGATIVE MG/DL
LEUKOCYTE ESTERASE UR QL STRIP: NEGATIVE
NITRATE UR QL: NEGATIVE
PH UR STRIP: 6.5 [PH] (ref 5–7)
RBC #/AREA URNS AUTO: ABNORMAL /HPF
SP GR UR STRIP: >=1.03 (ref 1–1.03)
SQUAMOUS #/AREA URNS AUTO: ABNORMAL /LPF
UROBILINOGEN UR STRIP-ACNC: 0.2 E.U./DL
WBC #/AREA URNS AUTO: ABNORMAL /HPF

## 2025-06-19 PROCEDURE — 81001 URINALYSIS AUTO W/SCOPE: CPT | Performed by: PHYSICIAN ASSISTANT

## 2025-06-19 PROCEDURE — 3074F SYST BP LT 130 MM HG: CPT | Performed by: PHYSICIAN ASSISTANT

## 2025-06-19 PROCEDURE — 3078F DIAST BP <80 MM HG: CPT | Performed by: PHYSICIAN ASSISTANT

## 2025-06-19 PROCEDURE — 99213 OFFICE O/P EST LOW 20 MIN: CPT | Performed by: PHYSICIAN ASSISTANT

## 2025-06-19 PROCEDURE — 1125F AMNT PAIN NOTED PAIN PRSNT: CPT | Performed by: PHYSICIAN ASSISTANT

## 2025-06-19 RX ORDER — NITROFURANTOIN 25; 75 MG/1; MG/1
100 CAPSULE ORAL 2 TIMES DAILY
Qty: 6 CAPSULE | Refills: 0 | Status: SHIPPED | OUTPATIENT
Start: 2025-06-19 | End: 2025-06-22

## 2025-06-19 ASSESSMENT — PATIENT HEALTH QUESTIONNAIRE - PHQ9
SUM OF ALL RESPONSES TO PHQ QUESTIONS 1-9: 8
SUM OF ALL RESPONSES TO PHQ QUESTIONS 1-9: 8
10. IF YOU CHECKED OFF ANY PROBLEMS, HOW DIFFICULT HAVE THESE PROBLEMS MADE IT FOR YOU TO DO YOUR WORK, TAKE CARE OF THINGS AT HOME, OR GET ALONG WITH OTHER PEOPLE: SOMEWHAT DIFFICULT

## 2025-06-19 ASSESSMENT — PAIN SCALES - GENERAL: PAINLEVEL_OUTOF10: MILD PAIN (1)

## 2025-06-19 NOTE — PROGRESS NOTES
{PROVIDER CHARTING PREFERENCE:956787}    Subjective   Cindi is a 24 year old, presenting for the following health issues:  UTI        12/27/2023    10:48 AM   Additional Questions   Roomed by Ludy PELAEZ     UTI    History of Present Illness       Reason for visit:  Uti  Symptom onset:  3-4 weeks ago   She is taking medications regularly.        {MA/LPN/RN Pre-Provider Visit Orders- hCG/UA/Strep (Optional):732158}  Genitourinary - Female  Onset/Duration: At least a month  Description:   Painful urination (Dysuria): YES           Frequency: YES  Blood in urine (Hematuria): YES- 2 days ago  Delay in urine (Hesitency): YES  Intensity: mild  Progression of Symptoms:  worsening  Accompanying Signs & Symptoms:  Fever/chills: YES- chills yesterday  Flank pain: No  Nausea and vomiting: YES- nausea  Vaginal symptoms: odor  Abdominal/Pelvic Pain: No  History:   History of frequent UTI s: YES - estimates 3 or 4 in the last year  History of kidney stones: No  Sexually Active: No  Possibility of pregnancy: No  Precipitating or alleviating factors: None  Therapies tried and outcome: Pyridium  and Increase fluid intake, hibiscus tea, amoxicillin 500mg TID x 7 days finished about 3 weeks ago  {additonal problems for provider to add (Optional):346374}    {ROS Picklists (Optional):174953}      Objective    There were no vitals taken for this visit.  There is no height or weight on file to calculate BMI.  Physical Exam   {Exam List (Optional):736382}    {Diagnostic Test Results (Optional):284793}        Signed Electronically by: Martin Gill PA-C  {Email feedback regarding this note to [email or web address]   :146803}   oz)   SpO2 98%   BMI 25.80 kg/m    Body mass index is 25.8 kg/m .  Physical Exam   GENERAL: alert and no distress  EYES: Eyes grossly normal to inspection  PSYCH: mentation appears normal, affect normal/bright            Signed Electronically by: Martin Gill PA-C

## 2025-06-23 ENCOUNTER — PATIENT OUTREACH (OUTPATIENT)
Dept: CARE COORDINATION | Facility: CLINIC | Age: 25
End: 2025-06-23
Payer: COMMERCIAL

## 2025-07-17 ENCOUNTER — VIRTUAL VISIT (OUTPATIENT)
Dept: FAMILY MEDICINE | Facility: CLINIC | Age: 25
End: 2025-07-17
Payer: COMMERCIAL

## 2025-07-17 DIAGNOSIS — Z87.890 STATUS POST GENDER REASSIGNMENT SURGERY: ICD-10-CM

## 2025-07-17 DIAGNOSIS — Z79.890 HORMONE REPLACEMENT THERAPY: Primary | ICD-10-CM

## 2025-07-17 PROCEDURE — 98006 SYNCH AUDIO-VIDEO EST MOD 30: CPT | Performed by: FAMILY MEDICINE

## 2025-07-17 PROCEDURE — 1125F AMNT PAIN NOTED PAIN PRSNT: CPT | Mod: 95 | Performed by: FAMILY MEDICINE

## 2025-07-17 RX ORDER — ESTRADIOL 2 MG/1
3 TABLET ORAL DAILY
Qty: 135 TABLET | Refills: 1 | Status: SHIPPED | OUTPATIENT
Start: 2025-07-17

## 2025-07-17 ASSESSMENT — PATIENT HEALTH QUESTIONNAIRE - PHQ9
SUM OF ALL RESPONSES TO PHQ QUESTIONS 1-9: 6
SUM OF ALL RESPONSES TO PHQ QUESTIONS 1-9: 6
10. IF YOU CHECKED OFF ANY PROBLEMS, HOW DIFFICULT HAVE THESE PROBLEMS MADE IT FOR YOU TO DO YOUR WORK, TAKE CARE OF THINGS AT HOME, OR GET ALONG WITH OTHER PEOPLE: SOMEWHAT DIFFICULT

## 2025-07-17 ASSESSMENT — PAIN SCALES - GENERAL: PAINLEVEL_OUTOF10: MILD PAIN (3)

## 2025-07-17 NOTE — NURSING NOTE
Is the patient currently in the state of [place]? YES    Current patient location: At mom's house in [place].    Visit mode:Video    If the visit is dropped, the patient can be reconnected by: VIDEO VISIT: Text to cell phone:   Telephone Information:   Mobile [phone]       Will anyone else be joining the visit? No  (If patient encounters technical issues they should call [phone])    Are changes needed to the allergy or medication list? Pt stated no changes to allergies and Pt stated no med changes    Are refills needed on medications prescribed by this physician? No    Rooming Documentation: Questionnaire(s) completed.    Reason for visit: RECHECK     Ilene Smith, MURRAYF

## 2025-07-17 NOTE — PROGRESS NOTES
Virtual Visit Details    Type of service:  Video Visit     Originating Location (pt. Location): Home    Distant Location (provider location):  On-site  Platform used for Video Visit: Globalia          Answers submitted by the patient for this visit:  Patient Health Questionnaire (Submitted on 7/17/2025)  If you checked off any problems, how difficult have these problems made it for you to do your work, take care of things at home, or get along with other people?: Somewhat difficult  PHQ9 TOTAL SCORE: 6

## 2025-07-17 NOTE — PROGRESS NOTES
The patient has been notified of following:       Patient has given verbal consent for Video visit? Yes    Patient would like the video invitation sent by: Send to e-mail at:     Video Start Time: 2:56 PM             FOUZIA Puente is a 24 year old individual that uses pronouns She/Her/Hers/Herself that presents today for follow up of:  feminizing hormone therapy.   Alone or accompanied by: accompanied today by  Gender identity: female  Started Hormone  therapy  puberty suppression 2012, GAHT start 2013, s/p gender affirming genital surgery 2018.  Continues on Estrace 3* mg daily   Any special concerns today?    Felt better on doing 3 mg orally rather than SL dosing  Currently off estradiol due to recent surgery to correct rectovaginal fistula; patient will message providers about when to restart    On hormones?  No--see above  Gender related body changes since last visit: stable    Breakthrough bleeding? Does Not Apply    New health concerns since last visit:  ---none    No past surgical history on file.    Patient Active Problem List   Diagnosis    Attention deficit hyperactivity disorder (ADHD), combined type    Major depressive disorder, recurrent    Rectovaginal fistula    History of creation of ostomy (H)    Post traumatic stress disorder (PTSD)    Self-injurious behavior    Bulimia (H)    ARIELLE (generalized anxiety disorder)       Current Outpatient Medications   Medication Sig Dispense Refill    estradiol (ESTRACE) 2 MG tablet Take 1.5 tablets (3 mg) by mouth daily. (Patient not taking: Reported on 6/27/2025) 135 tablet 1    sertraline (ZOLOFT) 100 MG tablet Take 1.5 tablets (150 mg) by mouth daily. 135 tablet 1    traZODone (DESYREL) 50 MG tablet Take 2 tablets (100 mg) by mouth at bedtime. 30 tablet 2       History   Smoking Status    Former    Types: Cigarettes    Quit date: 2022   Smokeless Tobacco    Never        No Known Allergies    There are no preventive care reminders to display for this  patient.      Problem, Medication and Allergy Lists were reviewed and are current..         Review of Systems:   Review of Systems           Labs:   Results from last visit:  Office Visit on 06/27/2025   Component Date Value Ref Range Status    WBC Count 06/27/2025 5.4  4.0 - 11.0 10e3/uL Final    RBC Count 06/27/2025 4.79  3.80 - 5.20 10e6/uL Final    Hemoglobin 06/27/2025 13.6  11.7 - 15.7 g/dL Final    Hematocrit 06/27/2025 41.1  35.0 - 47.0 % Final    MCV 06/27/2025 86  78 - 100 fL Final    MCH 06/27/2025 28.4  26.5 - 33.0 pg Final    MCHC 06/27/2025 33.1  31.5 - 36.5 g/dL Final    RDW 06/27/2025 13.0  10.0 - 15.0 % Final    Platelet Count 06/27/2025 291  150 - 450 10e3/uL Final    Sodium 06/27/2025 137  135 - 145 mmol/L Final    Potassium 06/27/2025 4.3  3.4 - 5.3 mmol/L Final    Carbon Dioxide (CO2) 06/27/2025 25  22 - 29 mmol/L Final    Anion Gap 06/27/2025 9  7 - 15 mmol/L Final    Urea Nitrogen 06/27/2025 9.0  6.0 - 20.0 mg/dL Final    Creatinine 06/27/2025 0.58  0.51 - 0.95 mg/dL Final    GFR Estimate 06/27/2025 >90  >60 mL/min/1.73m2 Final    eGFR calculated using 2021 CKD-EPI equation.    Calcium 06/27/2025 9.1  8.8 - 10.4 mg/dL Final    Chloride 06/27/2025 103  98 - 107 mmol/L Final    Glucose 06/27/2025 108 (H)  70 - 99 mg/dL Final    Alkaline Phosphatase 06/27/2025 77  40 - 150 U/L Final    AST 06/27/2025 18  0 - 45 U/L Final    ALT 06/27/2025 11  0 - 50 U/L Final    Protein Total 06/27/2025 7.2  6.4 - 8.3 g/dL Final    Albumin 06/27/2025 4.4  3.5 - 5.2 g/dL Final    Bilirubin Total 06/27/2025 0.3  <=1.2 mg/dL Final    Color Urine 06/27/2025 Yellow  Colorless, Straw, Light Yellow, Yellow Final    Appearance Urine 06/27/2025 Clear  Clear Final    Glucose Urine 06/27/2025 Negative  Negative mg/dL Final    Bilirubin Urine 06/27/2025 Negative  Negative Final    Ketones Urine 06/27/2025 Trace (A)  Negative mg/dL Final    Specific Gravity Urine 06/27/2025 1.025  1.003 - 1.035 Final    Blood Urine 06/27/2025  Negative  Negative Final    pH Urine 06/27/2025 6.5  5.0 - 7.0 Final    Protein Albumin Urine 06/27/2025 Negative  Negative mg/dL Final    Urobilinogen Urine 06/27/2025 0.2  0.2, 1.0 E.U./dL Final    Nitrite Urine 06/27/2025 Negative  Negative Final    Leukocyte Esterase Urine 06/27/2025 Negative  Negative Final     Estradiol level from prior visit not done.      EXAM:  Constitutional: healthy, alert, and no distress   Psychiatric: mentation appears normal and affect normal/bright     Assessment and Plan   Hormone replacement  Person s/p gender affirming surgery  Clinically doing well on current estradiol dose  Lab: estradiol level 1 month after restarting  Recommend talking to surgeon about when to restart estradiol      Follow up:  Follow up in 6 months in person         Video-Visit Details    Type of service:  Video Visit    Video End Time (time video stopped): 306    Originating Location (pt. Location): Home    Distant Location (provider location):  [facility]     Mode of Communication:  Video Conference via video platform: Malika Luis MD      Results by [facility]  Questions were elicited and answered.     Ryan Luis MD      Answers submitted by the patient for this visit:  Patient Health Questionnaire (Submitted on 7/17/2025)  If you checked off any problems, how difficult have these problems made it for you to do your work, take care of things at home, or get along with other people?: Somewhat difficult  PHQ9 TOTAL SCORE: 6

## 2025-07-30 ENCOUNTER — VIRTUAL VISIT (OUTPATIENT)
Dept: PSYCHOLOGY | Facility: CLINIC | Age: 25
End: 2025-07-30
Payer: COMMERCIAL

## 2025-07-30 DIAGNOSIS — F43.10 PTSD (POST-TRAUMATIC STRESS DISORDER): Primary | ICD-10-CM
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
## 2025-08-08 ENCOUNTER — VIRTUAL VISIT (OUTPATIENT)
Dept: PSYCHOLOGY | Facility: CLINIC | Age: 25
End: 2025-08-08
Payer: COMMERCIAL

## 2025-08-08 DIAGNOSIS — F43.10 PTSD (POST-TRAUMATIC STRESS DISORDER): Primary | ICD-10-CM

## 2025-08-08 PROCEDURE — 90837 PSYTX W PT 60 MINUTES: CPT | Mod: 95 | Performed by: COUNSELOR

## 2025-08-27 ENCOUNTER — VIRTUAL VISIT (OUTPATIENT)
Dept: PSYCHOLOGY | Facility: CLINIC | Age: 25
End: 2025-08-27
Payer: COMMERCIAL